# Patient Record
Sex: FEMALE | Race: OTHER | HISPANIC OR LATINO | Employment: FULL TIME | URBAN - METROPOLITAN AREA
[De-identification: names, ages, dates, MRNs, and addresses within clinical notes are randomized per-mention and may not be internally consistent; named-entity substitution may affect disease eponyms.]

---

## 2020-07-25 ENCOUNTER — OFFICE VISIT (OUTPATIENT)
Dept: URGENT CARE | Facility: PHYSICIAN GROUP | Age: 20
End: 2020-07-25
Payer: COMMERCIAL

## 2020-07-25 VITALS — RESPIRATION RATE: 14 BRPM | BODY MASS INDEX: 27.7 KG/M2 | WEIGHT: 187 LBS | HEIGHT: 69 IN

## 2020-07-25 DIAGNOSIS — L03.012 CELLULITIS OF FINGER OF LEFT HAND: ICD-10-CM

## 2020-07-25 PROCEDURE — 99204 OFFICE O/P NEW MOD 45 MIN: CPT | Performed by: NURSE PRACTITIONER

## 2020-07-25 RX ORDER — RIZATRIPTAN BENZOATE 10 MG/1
10 TABLET ORAL
COMMUNITY
Start: 2019-01-15 | End: 2021-11-16 | Stop reason: SDUPTHER

## 2020-07-25 RX ORDER — KETOCONAZOLE 20 MG/ML
SHAMPOO TOPICAL
COMMUNITY
Start: 2019-03-04 | End: 2021-03-03

## 2020-07-25 RX ORDER — NORTRIPTYLINE HYDROCHLORIDE 50 MG/1
50 CAPSULE ORAL
COMMUNITY
Start: 2019-01-15 | End: 2021-06-07

## 2020-07-25 RX ORDER — LEVONORGESTREL AND ETHINYL ESTRADIOL 0.1-0.02MG
1 KIT ORAL DAILY
COMMUNITY
Start: 2020-05-14 | End: 2021-06-07

## 2020-07-25 RX ORDER — IBUPROFEN 800 MG/1
800 TABLET ORAL
COMMUNITY
Start: 2018-06-12 | End: 2021-06-07 | Stop reason: SDUPTHER

## 2020-07-25 RX ORDER — ONDANSETRON HYDROCHLORIDE 8 MG/1
8 TABLET, FILM COATED ORAL
COMMUNITY
Start: 2020-03-27 | End: 2021-11-18

## 2020-07-25 RX ORDER — AMOXICILLIN 500 MG/1
500 CAPSULE ORAL 2 TIMES DAILY
Qty: 14 CAP | Refills: 0 | Status: SHIPPED | OUTPATIENT
Start: 2020-07-25 | End: 2020-08-01

## 2020-07-25 SDOH — HEALTH STABILITY: MENTAL HEALTH: HOW OFTEN DO YOU HAVE A DRINK CONTAINING ALCOHOL?: 2-4 TIMES A MONTH

## 2020-07-25 ASSESSMENT — ENCOUNTER SYMPTOMS
TINGLING: 0
BRUISES/BLEEDS EASILY: 0
SENSORY CHANGE: 0
WEAKNESS: 0
FEVER: 0
MYALGIAS: 0
CHILLS: 0
SHORTNESS OF BREATH: 0

## 2020-07-25 NOTE — PROGRESS NOTES
"Subjective:      Gretel Aguilar is a 20 y.o. female who presents with Nail Problem (L middle finger red and swollen x1wkj )            HPI  Admits to left middle finger redness, swelling x 1 week. Admits to bititng nails and picking at cuticles. Denies fever or malaise, no red streaking of hand. Warm water soaks and TAB ointment. Using hydrogen peroxide to clean.    PMH:  has no past medical history on file.  MEDS:   Current Outpatient Medications:   •  nortriptyline (PAMELOR) 50 MG capsule, Take 50 mg by mouth., Disp: , Rfl:   •  rizatriptan (MAXALT) 10 MG tablet, Take 10 mg by mouth., Disp: , Rfl:   •  ondansetron (ZOFRAN) 8 MG Tab, Take 8 mg by mouth., Disp: , Rfl:   •  levonorgestrel-ethinyl estradiol (AVIANE) 0.1-20 MG-MCG per tablet, Take 1 Tab by mouth every day., Disp: , Rfl:   •  ibuprofen (MOTRIN) 800 MG Tab, Take 800 mg by mouth., Disp: , Rfl:   •  ketoconazole (NIZORAL) 2 % shampoo, Apply  to affected area(s)., Disp: , Rfl:   •  amoxicillin (AMOXIL) 500 MG Cap, Take 1 Cap by mouth 2 times a day for 7 days., Disp: 14 Cap, Rfl: 0  ALLERGIES: Not on File  SURGHX: No past surgical history on file.  SOCHX:  reports that she has never smoked. She has never used smokeless tobacco. She reports current alcohol use. She reports that she does not use drugs.  FH: Family history was reviewed, no pertinent findings to report    Review of Systems   Constitutional: Negative for chills, fever and malaise/fatigue.   Respiratory: Negative for shortness of breath.    Musculoskeletal: Negative for joint pain and myalgias.   Skin: Negative for itching and rash.   Neurological: Negative for tingling, sensory change and weakness.   Endo/Heme/Allergies: Does not bruise/bleed easily.   All other systems reviewed and are negative.         Objective:     Resp 14   Ht 1.753 m (5' 9\")   Wt 84.8 kg (187 lb)   BMI 27.62 kg/m²      Physical Exam  Vitals signs reviewed.   Constitutional:       General: She is awake. She is not in acute " distress.     Appearance: Normal appearance. She is well-developed. She is not ill-appearing, toxic-appearing or diaphoretic.   HENT:      Head: Normocephalic.   Cardiovascular:      Rate and Rhythm: Normal rate.   Pulmonary:      Effort: Pulmonary effort is normal.   Musculoskeletal:         General: Swelling and tenderness present. No deformity or signs of injury.      Left hand: She exhibits tenderness and swelling. She exhibits normal range of motion, no bony tenderness, normal two-point discrimination, normal capillary refill, no deformity and no laceration. Normal sensation noted. Normal strength noted.   Skin:     General: Skin is warm and dry.      Coloration: Skin is not ashen, cyanotic, mottled, pale or sallow.      Findings: Erythema and wound present. No abrasion, bruising, ecchymosis or laceration.      Comments: Redness and swelling at base of cuticle bed. Open superficial wounds at lateral aspect of right middle finger cuticle edge, no active bleeding or d/c, mild TTP at site.    Neurological:      Mental Status: She is alert and oriented to person, place, and time.   Psychiatric:         Behavior: Behavior is cooperative.                 Assessment/Plan:       1. Cellulitis of finger of left hand    - amoxicillin (AMOXIL) 500 MG Cap; Take 1 Cap by mouth 2 times a day for 7 days.  Dispense: 14 Cap; Refill: 0    May use appropriate NSAID prn for discomfort  Wash in neutral pH, non-perfumed soap and lukewarm water, pat dry  Soak finger in Epsom salts prn  Cool compresses prn   Monitor for increase in redness of skin or spreading up hand/arm, blistering, weeping of sites, heat to skin, fever, malaise- need re-evaluation

## 2020-12-17 ENCOUNTER — OFFICE VISIT (OUTPATIENT)
Dept: URGENT CARE | Facility: CLINIC | Age: 20
End: 2020-12-17
Payer: COMMERCIAL

## 2020-12-17 VITALS
HEIGHT: 69 IN | HEART RATE: 95 BPM | OXYGEN SATURATION: 97 % | DIASTOLIC BLOOD PRESSURE: 88 MMHG | BODY MASS INDEX: 28.58 KG/M2 | SYSTOLIC BLOOD PRESSURE: 122 MMHG | WEIGHT: 193 LBS | RESPIRATION RATE: 16 BRPM | TEMPERATURE: 97.8 F

## 2020-12-17 DIAGNOSIS — R19.7 VOMITING AND DIARRHEA: ICD-10-CM

## 2020-12-17 DIAGNOSIS — R11.10 VOMITING AND DIARRHEA: ICD-10-CM

## 2020-12-17 PROCEDURE — 99214 OFFICE O/P EST MOD 30 MIN: CPT | Performed by: PHYSICIAN ASSISTANT

## 2020-12-17 RX ORDER — INFLUENZA A VIRUS A/IDAHO/07/2018 (H1N1) ANTIGEN (MDCK CELL DERIVED, PROPIOLACTONE INACTIVATED, INFLUENZA A VIRUS A/INDIANA/08/2018 (H3N2) ANTIGEN (MDCK CELL DERIVED, PROPIOLACTONE INACTIVATED), INFLUENZA B VIRUS B/SINGAPORE/INFTT-16-0610/2016 ANTIGEN (MDCK CELL DERIVED, PROPIOLACTONE INACTIVATED), INFLUENZA B VIRUS B/IOWA/06/2017 ANTIGEN (MDCK CELL DERIVED, PROPIOLACTONE INACTIVATED) 15; 15; 15; 15 UG/.5ML; UG/.5ML; UG/.5ML; UG/.5ML
INJECTION, SUSPENSION INTRAMUSCULAR
COMMUNITY
Start: 2020-10-15 | End: 2021-06-07

## 2020-12-17 RX ORDER — MEDROXYPROGESTERONE ACETATE 150 MG/ML
150 INJECTION, SUSPENSION INTRAMUSCULAR
COMMUNITY
Start: 2019-02-26 | End: 2021-06-07

## 2020-12-17 RX ORDER — ONDANSETRON 4 MG/1
4 TABLET, ORALLY DISINTEGRATING ORAL ONCE
Status: COMPLETED | OUTPATIENT
Start: 2020-12-17 | End: 2020-12-17

## 2020-12-17 RX ORDER — RIZATRIPTAN BENZOATE 10 MG/1
10 TABLET ORAL
COMMUNITY
Start: 2020-09-09 | End: 2021-06-07

## 2020-12-17 RX ORDER — CLOBETASOL PROPIONATE 0.46 MG/ML
SOLUTION TOPICAL
COMMUNITY
Start: 2019-03-04 | End: 2021-03-03

## 2020-12-17 RX ORDER — ONDANSETRON 4 MG/1
4 TABLET, ORALLY DISINTEGRATING ORAL EVERY 6 HOURS PRN
Qty: 10 TAB | Refills: 0 | Status: SHIPPED | OUTPATIENT
Start: 2020-12-17 | End: 2021-06-07

## 2020-12-17 RX ORDER — NORTRIPTYLINE HYDROCHLORIDE 50 MG/1
50 CAPSULE ORAL
COMMUNITY
Start: 2020-10-09 | End: 2021-06-07

## 2020-12-17 RX ADMIN — ONDANSETRON 4 MG: 4 TABLET, ORALLY DISINTEGRATING ORAL at 14:10

## 2020-12-17 ASSESSMENT — ENCOUNTER SYMPTOMS
CHANGE IN BOWEL HABIT: 1
ABDOMINAL PAIN: 1
PALPITATIONS: 0
COUGH: 0
DIAPHORESIS: 0
FEVER: 0
CHILLS: 0
HEADACHES: 0
WEIGHT LOSS: 0
WEAKNESS: 0
VOMITING: 1
HEARTBURN: 0
BLOOD IN STOOL: 0
SHORTNESS OF BREATH: 0
DIZZINESS: 0
NUMBER OF EPISODES OF EMESIS TODAY: 1
CONSTIPATION: 0
DIARRHEA: 1
NAUSEA: 1

## 2020-12-17 NOTE — PATIENT INSTRUCTIONS
Food Choices to Help Relieve Diarrhea, Adult  When you have diarrhea, the foods you eat and your eating habits are very important. Choosing the right foods and drinks can help:  · Relieve diarrhea.  · Replace lost fluids and nutrients.  · Prevent dehydration.  What general guidelines should I follow?    Relieving diarrhea  · Choose foods with less than 2 g or .07 oz. of fiber per serving.  · Limit fats to less than 8 tsp (38 g or 1.34 oz.) a day.  · Avoid the following:  ? Foods and beverages sweetened with high-fructose corn syrup, honey, or sugar alcohols such as xylitol, sorbitol, and mannitol.  ? Foods that contain a lot of fat or sugar.  ? Fried, greasy, or spicy foods.  ? High-fiber grains, breads, and cereals.  ? Raw fruits and vegetables.  · Eat foods that are rich in probiotics. These foods include dairy products such as yogurt and fermented milk products. They help increase healthy bacteria in the stomach and intestines (gastrointestinal tract, or GI tract).  · If you have lactose intolerance, avoid dairy products. These may make your diarrhea worse.  · Take medicine to help stop diarrhea (antidiarrheal medicine) only as told by your health care provider.  Replacing nutrients  · Eat small meals or snacks every 3-4 hours.  · Eat bland foods, such as white rice, toast, or baked potato, until your diarrhea starts to get better. Gradually reintroduce nutrient-rich foods as tolerated or as told by your health care provider. This includes:  ? Well-cooked protein foods.  ? Peeled, seeded, and soft-cooked fruits and vegetables.  ? Low-fat dairy products.  · Take vitamin and mineral supplements as told by your health care provider.  Preventing dehydration  · Start by sipping water or a special solution to prevent dehydration (oral rehydration solution, ORS). Urine that is clear or pale yellow means that you are getting enough fluid.  · Try to drink at least 8-10 cups of fluid each day to help replace lost  fluids.  · You may add other liquids in addition to water, such as clear juice or decaffeinated sports drinks, as tolerated or as told by your health care provider.  · Avoid drinks with caffeine, such as coffee, tea, or soft drinks.  · Avoid alcohol.  What foods are recommended?         The items listed may not be a complete list. Talk with your health care provider about what dietary choices are best for you.  Grains  White rice. White, German, or pam breads (fresh or toasted), including plain rolls, buns, or bagels. White pasta. Saltine, soda, or nafisa crackers. Pretzels. Low-fiber cereal. Cooked cereals made with water (such as cornmeal, farina, or cream cereals). Plain muffins. Matzo. [place] toast. [place].  Vegetables  Potatoes (without the skin). Most well-cooked and canned vegetables without skins or seeds. Tender lettuce.  Fruits  Apple sauce. Fruits canned in juice. Cooked apricots, cherries, grapefruit, peaches, pears, or plums. Fresh bananas and cantaloupe.  Meats and other protein foods  Baked or boiled chicken. Eggs. Tofu. Fish. Seafood. Smooth nut butters. Ground or well-cooked tender beef, ham, veal, lamb, pork, or poultry.  Dairy  Plain yogurt, kefir, and unsweetened liquid yogurt. Lactose-free milk, buttermilk, skim milk, or soy milk. Low-fat or nonfat hard cheese.  Beverages  Water. Low-calorie sports drinks. Fruit juices without pulp. Strained tomato and vegetable juices. Decaffeinated teas. Sugar-free beverages not sweetened with sugar alcohols. Oral rehydration solutions, if approved by your health care provider.  Seasoning and other foods  Bouillon, broth, or soups made from recommended foods.  What foods are not recommended?  The items listed may not be a complete list. Talk with your health care provider about what dietary choices are best for you.  Grains  Whole grain, whole wheat, bran, or rye breads, rolls, pastas, and crackers. Wild or brown rice. Whole grain or bran cereals. Barley.  Oats and oatmeal. Corn tortillas or taco shells. Granola. Popcorn.  Vegetables  Raw vegetables. Fried vegetables. Cabbage, broccoli, [place] sprouts, artichokes, baked beans, beet greens, corn, kale, legumes, peas, sweet potatoes, and yams. Potato skins. Cooked spinach and cabbage.  Fruits  Dried fruit, including raisins and dates. Raw fruits. Stewed or dried prunes. Canned fruits with syrup.  Meat and other protein foods  Fried or fatty meats. Deli meats. [place] nut butters. Nuts and seeds. Beans and lentils. [place]. Hot dogs. Sausage.  Dairy  High-fat cheeses. Whole milk, chocolate milk, and beverages made with milk, such as milk shakes. Half-and-half. Cream. sour cream. Ice cream.  Beverages  Caffeinated beverages (such as coffee, tea, soda, or energy drinks). Alcoholic beverages. Fruit juices with pulp. Prune juice. Soft drinks sweetened with high-fructose corn syrup or sugar alcohols. High-calorie sports drinks.  Fats and oils  Butter. Cream sauces. Margarine. Salad oils. Plain salad dressings. Olives. Avocados. Mayonnaise.  Sweets and desserts  Sweet rolls, doughnuts, and sweet breads. Sugar-free desserts sweetened with sugar alcohols such as xylitol and sorbitol.  Seasoning and other foods  Honey. Hot sauce. Chili powder. Gravy. Cream-based or milk-based soups. Pancakes and waffles.  Summary  · When you have diarrhea, the foods you eat and your eating habits are very important.  · Make sure you get at least 8-10 cups of fluid each day, or enough to keep your urine clear or pale yellow.  · Eat bland foods and gradually reintroduce healthy, nutrient-rich foods as tolerated, or as told by your health care provider.  · Avoid high-fiber, fried, greasy, or spicy foods.  This information is not intended to replace advice given to you by your health care provider. Make sure you discuss any questions you have with your health care provider.  Document Released: 03/09/2005 Document Revised: 04/09/2020 Document Reviewed:  12/15/2017  Elsevier Patient Education © 2020 [employer] Inc[employer]

## 2020-12-17 NOTE — LETTER
December 17, 2020         Patient: Gretel Aguilar   YOB: 2000   Date of Visit: 12/17/2020           To Whom it May Concern:    Gretel Aguilar was seen in my clinic on 12/17/2020. Please excuse her from work 12/18/2020.    If you have any questions or concerns, please don't hesitate to call.        Sincerely,           Glenn Mahajan P.A.-C.  Electronically Signed

## 2020-12-17 NOTE — PROGRESS NOTES
"Subjective:   Gretel Aguilar is a 20 y.o. female who presents for Emesis (x 16 hours, continuous)      Emesis  This is a new problem. The current episode started yesterday. The problem occurs constantly. The problem has been unchanged. Associated symptoms include abdominal pain, a change in bowel habit, nausea and vomiting. Pertinent negatives include no chest pain, chills, coughing, diaphoresis, fever, headaches, rash or weakness. Nothing aggravates the symptoms. She has tried nothing for the symptoms.       Review of Systems   Constitutional: Positive for malaise/fatigue. Negative for chills, diaphoresis, fever and weight loss.   Respiratory: Negative for cough and shortness of breath.    Cardiovascular: Negative for chest pain and palpitations.   Gastrointestinal: Positive for abdominal pain, change in bowel habit, diarrhea, nausea and vomiting. Negative for blood in stool, constipation, heartburn and melena.   Skin: Negative for itching and rash.   Neurological: Negative for dizziness, weakness and headaches.   All other systems reviewed and are negative.      Medications:    • clobetasol  • Flucelvax Quadrivalent Susp  • ibuprofen Tabs  • ketoconazole  • levonorgestrel-ethinyl estradiol  • medroxyPROGESTERone Jerrica  • nortriptyline  • ondansetron Tabs  • ondansetron Tbdp  • rizatriptan    Allergies: Patient has no known allergies.    Problem List: Gretel Aguilar does not have a problem list on file.    Surgical History:  No past surgical history on file.    Past Social Hx: Gretel Aguilar  reports that she has never smoked. She has never used smokeless tobacco. She reports current alcohol use. She reports that she does not use drugs.     Past Family Hx:  Gretel Aguilar family history is not on file.     Problem list, medications, and allergies reviewed by myself today in Epic.     Objective:     Blood Pressure 122/88   Pulse 95   Temperature 36.6 °C (97.8 °F) (Temporal)   Respiration 16   Height 1.753 m (5' 9\")  "  Weight 87.5 kg (193 lb)   Oxygen Saturation 97%   Body Mass Index 28.50 kg/m²     Physical Exam  Vitals signs reviewed.   Constitutional:       General: She is not in acute distress.     Appearance: Normal appearance. She is well-developed. She is not ill-appearing, toxic-appearing or diaphoretic.   HENT:      Head: Normocephalic and atraumatic.      Right Ear: External ear normal.      Left Ear: External ear normal.      Nose: Nose normal.   Eyes:      General: Lids are normal.      Conjunctiva/sclera: Conjunctivae normal.   Neck:      Musculoskeletal: Full passive range of motion without pain, normal range of motion and neck supple.   Cardiovascular:      Rate and Rhythm: Normal rate and regular rhythm.      Heart sounds: Normal heart sounds, S1 normal and S2 normal. No murmur. No friction rub. No gallop.    Pulmonary:      Effort: Pulmonary effort is normal. No respiratory distress.      Breath sounds: Normal breath sounds. No decreased breath sounds, wheezing or rales.   Chest:      Chest wall: No tenderness.   Abdominal:      General: Bowel sounds are normal. There is no distension or abdominal bruit.      Palpations: Abdomen is soft. Abdomen is not rigid. There is no shifting dullness, fluid wave, hepatomegaly, splenomegaly, mass or pulsatile mass.      Tenderness: There is abdominal tenderness. There is no right CVA tenderness, left CVA tenderness, guarding or rebound. Negative signs include Streeter's sign, Rovsing's sign, McBurney's sign, psoas sign and obturator sign.      Hernia: No hernia is present.      Comments: Abdomen: PTP in the generalized abdomen not out of proportion.  Soft and nondistended. Normal bowel sounds. No hepatosplenomegaly or masses, or hernias. No rebound or guarding.     Musculoskeletal: Normal range of motion.         General: No tenderness or deformity.   Skin:     General: Skin is warm and dry.      Findings: No bruising, ecchymosis or erythema.   Neurological:      Mental  Status: She is alert and oriented to person, place, and time.   Psychiatric:         Speech: Speech normal.         Behavior: Behavior normal.         Thought Content: Thought content normal.         Judgment: Judgment normal.         Assessment/Plan:     Medical Decision Making/Comments   Pt is a 20 yr old female who presents for evaluation of vomiting and diarrhea.  Pt states symptoms ongoing for 2 days.  Pt denies red flag symptoms of blood/mucus in BM, recent travel, camping, antibiotics/hospitalization.  Pt is not tolerated PO liquids.  Vital signs normal.  Abdomen: Soft, nontender, nondistended. Normal bowel sounds. No hepatosplenomegaly or masses, or hernias. No rebound or guarding.  Skin turgor is normal with no delayed tenting.  Probable AGE but if symptoms continue or worsen she is directed to ED for further evaluation.    Diagnosis differential includes but not limited to:    Common:  Gastroenteritis viral vs bacterial, cholecystitis, appendicitis.  Uncommon: IBD, obstructive/intussusception, ischemic colitis, c-diff, HUS.           Diagnosis and associated orders     1. Vomiting and diarrhea  ondansetron (ZOFRAN ODT) dispertab 4 mg    ondansetron (ZOFRAN ODT) 4 MG TABLET DISPERSIBLE   -oral hydration includin/2 tsp salt + 6 tsp sugar per 1 L  -BRAT/bland diet as tolerated  -Imodium OTC as needed             Differential diagnosis, natural history, supportive care, and indications for immediate follow-up discussed.    Advised the patient to follow-up with the primary care physician for recheck, reevaluation, and consideration of further management.    Please note that this dictation was created using voice recognition software. I have made a reasonable attempt to correct obvious errors, but I expect that there are errors of grammar and possibly content that I did not discover before finalizing the note.

## 2021-05-19 ENCOUNTER — TELEPHONE (OUTPATIENT)
Dept: SCHEDULING | Facility: IMAGING CENTER | Age: 21
End: 2021-05-19

## 2021-06-07 ENCOUNTER — HOSPITAL ENCOUNTER (OUTPATIENT)
Dept: LAB | Facility: MEDICAL CENTER | Age: 21
End: 2021-06-07
Attending: NURSE PRACTITIONER
Payer: COMMERCIAL

## 2021-06-07 ENCOUNTER — OFFICE VISIT (OUTPATIENT)
Dept: MEDICAL GROUP | Facility: LAB | Age: 21
End: 2021-06-07
Payer: COMMERCIAL

## 2021-06-07 VITALS
BODY MASS INDEX: 30.51 KG/M2 | WEIGHT: 206 LBS | RESPIRATION RATE: 12 BRPM | DIASTOLIC BLOOD PRESSURE: 72 MMHG | SYSTOLIC BLOOD PRESSURE: 120 MMHG | HEIGHT: 69 IN | HEART RATE: 95 BPM | TEMPERATURE: 97.4 F | OXYGEN SATURATION: 96 %

## 2021-06-07 DIAGNOSIS — D22.9 BENIGN SKIN MOLE: ICD-10-CM

## 2021-06-07 DIAGNOSIS — J45.990 EXERCISE-INDUCED ASTHMA: ICD-10-CM

## 2021-06-07 DIAGNOSIS — N80.9 ENDOMETRIOSIS DETERMINED BY LAPAROSCOPY: ICD-10-CM

## 2021-06-07 DIAGNOSIS — R19.7 DIARRHEA, UNSPECIFIED TYPE: ICD-10-CM

## 2021-06-07 PROBLEM — G43.909 MIGRAINE: Status: ACTIVE | Noted: 2018-10-03

## 2021-06-07 LAB
ANION GAP SERPL CALC-SCNC: 9 MMOL/L (ref 7–16)
BUN SERPL-MCNC: 10 MG/DL (ref 8–22)
CALCIUM SERPL-MCNC: 9.2 MG/DL (ref 8.5–10.5)
CHLORIDE SERPL-SCNC: 109 MMOL/L (ref 96–112)
CO2 SERPL-SCNC: 24 MMOL/L (ref 20–33)
CREAT SERPL-MCNC: 0.76 MG/DL (ref 0.5–1.4)
ERYTHROCYTE [DISTWIDTH] IN BLOOD BY AUTOMATED COUNT: 42.3 FL (ref 35.9–50)
GLUCOSE SERPL-MCNC: 87 MG/DL (ref 65–99)
HCT VFR BLD AUTO: 45 % (ref 37–47)
HGB BLD-MCNC: 14.4 G/DL (ref 12–16)
MCH RBC QN AUTO: 29.7 PG (ref 27–33)
MCHC RBC AUTO-ENTMCNC: 32 G/DL (ref 33.6–35)
MCV RBC AUTO: 92.8 FL (ref 81.4–97.8)
PLATELET # BLD AUTO: 338 K/UL (ref 164–446)
PMV BLD AUTO: 9.3 FL (ref 9–12.9)
POTASSIUM SERPL-SCNC: 4.3 MMOL/L (ref 3.6–5.5)
RBC # BLD AUTO: 4.85 M/UL (ref 4.2–5.4)
SODIUM SERPL-SCNC: 142 MMOL/L (ref 135–145)
WBC # BLD AUTO: 5 K/UL (ref 4.8–10.8)

## 2021-06-07 PROCEDURE — 85027 COMPLETE CBC AUTOMATED: CPT

## 2021-06-07 PROCEDURE — 99214 OFFICE O/P EST MOD 30 MIN: CPT | Performed by: NURSE PRACTITIONER

## 2021-06-07 PROCEDURE — 80048 BASIC METABOLIC PNL TOTAL CA: CPT

## 2021-06-07 PROCEDURE — 36415 COLL VENOUS BLD VENIPUNCTURE: CPT

## 2021-06-07 RX ORDER — IBUPROFEN 800 MG/1
800 TABLET ORAL EVERY 8 HOURS PRN
Qty: 30 TABLET | Refills: 3 | Status: SHIPPED | OUTPATIENT
Start: 2021-06-07 | End: 2022-03-16 | Stop reason: SDUPTHER

## 2021-06-07 RX ORDER — ALBUTEROL SULFATE 90 UG/1
2 AEROSOL, METERED RESPIRATORY (INHALATION) EVERY 6 HOURS PRN
Qty: 6.7 G | Refills: 2 | Status: SHIPPED | OUTPATIENT
Start: 2021-06-07 | End: 2022-08-23

## 2021-06-07 RX ORDER — ELAGOLIX 150 MG/1
150 TABLET, FILM COATED ORAL DAILY
COMMUNITY
Start: 2021-05-10 | End: 2022-06-16

## 2021-06-07 ASSESSMENT — PATIENT HEALTH QUESTIONNAIRE - PHQ9: CLINICAL INTERPRETATION OF PHQ2 SCORE: 0

## 2021-06-07 NOTE — PROGRESS NOTES
Subjective:     CC:  Diagnoses of Diarrhea, unspecified type, Endometriosis determined by laparoscopy, Benign skin mole, and Exercise-induced asthma were pertinent to this visit.    HISTORY OF THE PRESENT ILLNESS: Patient is a 21 y.o. female. This pleasant patient is here today to establish care and discuss the following:    Diarrhea  Patient reports that she had COVID in 1/2021 and since then she has noticed that she has had either diarrhea or very soft BMs. She reports that she will have a BM 3x per day. Before COVID she would only have 2 BMs per day. Denies melena, N/V, abdominal pain.     Endometriosis determined by laparoscopy  Patient has a history of endometriosis since adolescence. She is currently taking Orilissa 150 mg daily with relief of pain. She was started on the Orilissa in 3/2021. She also has an IUD that was placed in 3/2021. [facility]     Benign skin mole  Patient has had a mole on her back since childhood. Denies pain, itching. She reports that it has changed in size in the last 4 years so it is more noticeable now. Denies history of skin cancer, biopsies, blistering/severe sunburns, family history of skin cancer, family history of atypical moles.     Exercise-induced asthma  Patient had PFT testing done in March that was normal. Patient reports that during a heavy workout she will get short of breath and feels like the air feels very cold when she is breathing. Denies cough, wheezing, sleep disturbance.    Allergies: Patient has no known allergies.    Current Outpatient Medications Ordered in Epic   Medication Sig Dispense Refill   • ORILISSA 150 MG Tab Take 150 mg by mouth every day. TAKE 1 TABLET BY MOUTH DAILY     • albuterol 108 (90 Base) MCG/ACT Aero Soln inhalation aerosol Inhale 2 Puffs every 6 hours as needed for Shortness of Breath. 6.7 g 2   • ibuprofen (MOTRIN) 800 MG Tab Take 1 tablet by mouth every 8 hours as needed. 30 tablet 3   •  "rizatriptan (MAXALT) 10 MG tablet Take 10 mg by mouth.     • ondansetron (ZOFRAN) 8 MG Tab Take 8 mg by mouth.       No current Epic-ordered facility-administered medications on file.       Past Medical History:   Diagnosis Date   • Endometriosis    • Migraine        Past Surgical History:   Procedure Laterality Date   • ENDOMETRIAL ABLATION         Social History     Tobacco Use   • Smoking status: Never Smoker   • Smokeless tobacco: Never Used   Vaping Use   • Vaping Use: Never used   Substance Use Topics   • Alcohol use: Yes     Alcohol/week: 1.2 oz     Types: 2 Standard drinks or equivalent per week   • Drug use: Never       Family History   Problem Relation Age of Onset   • Heart Disease Maternal Grandmother    • Diabetes Paternal Grandmother    • Cancer Paternal Grandfather         liver, skin       ROS:   Gen: no fevers/chills, no changes in weight  Eyes: no changes in vision  ENT: no sore throat, no hearing loss, no bloody nose  Pulm: no sob, no cough  CV: no chest pain, no palpitations  GI: no nausea/vomiting, no diarrhea  : no dysuria  MSk: no myalgias  Skin: no rash  Neuro: no headaches, no numbness/tingling  Heme/Lymph: no easy bruising        - NOTE: All other systems reviewed and are negative, except as in HPI.      Objective:     Exam: /72 (BP Location: Right arm, Patient Position: Sitting, BP Cuff Size: Adult)   Pulse 95   Temp 36.3 °C (97.4 °F)   Resp 12   Ht 1.753 m (5' 9\")   Wt 93.4 kg (206 lb)   SpO2 96%  Body mass index is 30.42 kg/m².    General: Normal appearing. No distress.  HEENT: Normocephalic. Eyes conjunctiva clear lids without ptosis, pupils equal and reactive to light accommodation, ears normal shape and contour, canals are clear bilaterally, tympanic membranes are benign, nasal mucosa benign, oropharynx is without erythema, edema or exudates.   Neck: Supple without JVD or bruit. Thyroid is not enlarged.  Pulmonary: Clear to ausculation.  Normal effort. No rales, ronchi, " or wheezing.  Cardiovascular: Regular rate and rhythm without murmur. Carotid and radial pulses are intact and equal bilaterally.  Abdomen: Soft, nontender, nondistended. Normal bowel sounds. Liver and spleen are not palpable  Neurologic: Grossly nonfocal  Lymph: No cervical or supraclavicular lymph nodes are palpable  Skin: Warm and dry.  5mm papule on right scapula, appearing benign.  Musculoskeletal: Normal gait. No extremity cyanosis, clubbing, or edema.  Psych: Normal mood and affect. Alert and oriented x3. Judgment and insight is normal.    Labs: Ordered today in office.     Assessment & Plan:   21 y.o. female with the following -    1. Diarrhea, unspecified type  Discussed likely etiology of taking Orilissa. Discussed diet changes to include more fiber. Patient can take OTC anti-diarrheal as needed. Continue to monitor.    2. Endometriosis determined by laparoscopy  Continue to follow with gynecology. Take medication as prescribed. Continue to monitor.  - ibuprofen (MOTRIN) 800 MG Tab; Take 1 tablet by mouth every 8 hours as needed.  Dispense: 30 tablet; Refill: 3    3. Change in mole  Nevi is benign appearing. CMN on back. Reviewed ABCDEs and skin cancer detection/prevention. Patient to return to clinic for growth/changes/concerning features. Will consider referral to dermatology for removal options, if patient desires in the future.  Reports preference to defer today.     4. Exercise-induced asthma  Prescribed an albuterol inhaler for patient to use as needed before exercising. No symptoms or evidence of acute exacerbation.  Action plan discussed and understood by the patient.  - albuterol 108 (90 Base) MCG/ACT Aero Soln inhalation aerosol; Inhale 2 Puffs every 6 hours as needed for Shortness of Breath.  Dispense: 6.7 g; Refill: 2    Return in about 6 months (around 12/7/2021).    Please note that this dictation was created using voice recognition software. I have made every reasonable attempt to correct  obvious errors, but I expect that there are errors of grammar and possibly content that I did not discover before finalizing the note.

## 2021-06-07 NOTE — ASSESSMENT & PLAN NOTE
Patient had PFT testing done in March that was normal. Patient reports that during a heavy workout she will get short of breath and feels like the air feels very cold when she is breathing. Denies cough, wheezing, sleep disturbance.  
Patient has a history of endometriosis since adolescence. She is currently taking Orilissa 150 mg daily with relief of pain. She was started on the Orilissa in 3/2021. She also has an IUD that was placed in 3/2021. She is currently following with [facility].   
Patient has had a mole on her back since childhood. Denies pain, itching. She reports that it has changed in size in the last 4 years so it is more noticeable now. Denies history of skin cancer, biopsies, blistering/severe sunburns, family history of skin cancer, family history of atypical moles.   
Patient reports that she had COVID in 1/2021 and since then she has noticed that she has had either diarrhea or very soft BMs. She reports that she will have a BM 3x per day. Before COVID she would only have 2 BMs per day. Denies melena, N/V, abdominal pain.   
Statement Selected

## 2021-11-01 ENCOUNTER — OFFICE VISIT (OUTPATIENT)
Dept: MEDICAL GROUP | Facility: LAB | Age: 21
End: 2021-11-01
Payer: COMMERCIAL

## 2021-11-01 VITALS
DIASTOLIC BLOOD PRESSURE: 74 MMHG | BODY MASS INDEX: 30.66 KG/M2 | HEIGHT: 69 IN | WEIGHT: 207 LBS | HEART RATE: 78 BPM | TEMPERATURE: 97.9 F | RESPIRATION RATE: 14 BRPM | SYSTOLIC BLOOD PRESSURE: 106 MMHG | OXYGEN SATURATION: 94 %

## 2021-11-01 DIAGNOSIS — J02.9 PHARYNGITIS, UNSPECIFIED ETIOLOGY: ICD-10-CM

## 2021-11-01 LAB
INT CON NEG: NEGATIVE
INT CON POS: POSITIVE
S PYO AG THROAT QL: NEGATIVE

## 2021-11-01 PROCEDURE — 99213 OFFICE O/P EST LOW 20 MIN: CPT | Performed by: NURSE PRACTITIONER

## 2021-11-01 PROCEDURE — 87880 STREP A ASSAY W/OPTIC: CPT | Performed by: NURSE PRACTITIONER

## 2021-11-01 RX ORDER — METHYLPREDNISOLONE 4 MG/1
TABLET ORAL
Qty: 21 TABLET | Refills: 0 | Status: SHIPPED | OUTPATIENT
Start: 2021-11-01 | End: 2021-11-18

## 2021-11-01 NOTE — LETTER
November 1, 2021         Patient: Gretel Aguilar   YOB: 2000   Date of Visit: 11/1/2021           To Whom it May Concern:    Gretel Aguilar was seen in my clinic on 11/1/2021. She may return to work on 11/3/2021.    If you have any questions or concerns, please don't hesitate to call.        Sincerely,           ELBA Fraser.

## 2021-11-01 NOTE — PROGRESS NOTES
"Subjective:     CC: The encounter diagnosis was Pharyngitis, unspecified etiology.    HPI:   Gretel presents today with the following:    Upper respiratory infection  Onset 4 days ago. Current symptoms sore throat, hoarse voice, barking cough, lymphadenopathy.   Denies congestion, sinus pain/pressure, headache, fever, chills, myalgias, loss of taste/smell, shortness of breath.   Patient was vaccinated against covid 4/2021. She tested negative for covid today.     Current Outpatient Medications Ordered in Epic   Medication Sig Dispense Refill   • methylPREDNISolone (MEDROL DOSEPAK) 4 MG Tablet Therapy Pack As directed on the packaging label. 21 Tablet 0   • ORILISSA 150 MG Tab Take 150 mg by mouth every day. TAKE 1 TABLET BY MOUTH DAILY     • albuterol 108 (90 Base) MCG/ACT Aero Soln inhalation aerosol Inhale 2 Puffs every 6 hours as needed for Shortness of Breath. 6.7 g 2   • ibuprofen (MOTRIN) 800 MG Tab Take 1 tablet by mouth every 8 hours as needed. 30 tablet 3   • rizatriptan (MAXALT) 10 MG tablet Take 10 mg by mouth.     • ondansetron (ZOFRAN) 8 MG Tab Take 8 mg by mouth.       No current Epic-ordered facility-administered medications on file.     ROS:   As documented in history of present illness above    Objective:     Exam: /74 (BP Location: Right arm, Patient Position: Sitting, BP Cuff Size: Adult)   Pulse 78   Temp 36.6 °C (97.9 °F)   Resp 14   Ht 1.753 m (5' 9\")   Wt 93.9 kg (207 lb)   SpO2 94%  Body mass index is 30.57 kg/m².    General: Normal appearing. No distress.  HEENT: Normocephalic. Eyes conjunctiva clear lids without ptosis, pupils equal and reactive to light accommodation, ears normal shape and contour, canals are clear bilaterally, tympanic membranes are benign, nasal mucosa benign, oropharynx is without erythema, edema or exudates.   Neck: Supple without JVD or bruit. Thyroid is not enlarged.  Pulmonary: Clear to ausculation.  Normal effort. No rales, ronchi, or " wheezing.  Cardiovascular: Regular rate and rhythm without murmur. Carotid and radial pulses are intact and equal bilaterally.  Abdomen: Soft, nontender, nondistended. Normal bowel sounds. Liver and spleen are not palpable  Neurologic: Grossly nonfocal  Lymph: No cervical or supraclavicular lymph nodes are palpable  Skin: Warm and dry.  No obvious lesions.  Musculoskeletal: Normal gait. No extremity cyanosis, clubbing, or edema.  Psych: Normal mood and affect. Alert and oriented x3. Judgment and insight is normal.    Assessment & Plan:     21 y.o. female with the following -     1. Pharyngitis, unspecified etiology  Patient to take medication as prescribed. Side effects of medication prescribed today were discussed with the patient including how to take the medication and proper dosage. Discussed repercussions of not taking the medication as prescribed. Instructed to call the office should she have any negative side effects or problems with the medication. Supportive care, differential diagnoses, and indications for immediate follow-up discussed with patient. Pathogenesis of diagnosis discussed including typical length and natural progression. Instructed to return to clinic or nearest emergency department for any change in condition, further concerns, or worsening of symptoms.  - POCT Rapid Strep A  - methylPREDNISolone (MEDROL DOSEPAK) 4 MG Tablet Therapy Pack; As directed on the packaging label.  Dispense: 21 Tablet; Refill: 0    Return if symptoms worsen or fail to improve.    Please note that this dictation was created using voice recognition software. I have made every reasonable attempt to correct obvious errors, but I expect that there are errors of grammar and possibly content that I did not discover before finalizing the note.

## 2021-11-16 RX ORDER — RIZATRIPTAN BENZOATE 10 MG/1
10 TABLET ORAL
Qty: 10 TABLET | Refills: 11 | Status: SHIPPED | OUTPATIENT
Start: 2021-11-16 | End: 2022-03-16 | Stop reason: SDUPTHER

## 2021-11-16 NOTE — TELEPHONE ENCOUNTER
Received request via: Pharmacy    Was the patient seen in the last year in this department? Yes  11/1/21  Does the patient have an active prescription (recently filled or refills available) for medication(s) requested? No

## 2021-11-16 NOTE — TELEPHONE ENCOUNTER
----- Message from Gretel Aguilar sent at 11/16/2021 10:48 AM PST -----  Regarding: Refill on Rizatriptan   Hi, I was wondering if I am able to get a refill of prescription of the rizatriptan, I have about one left from my previous prescription from my other provider before I switched over to [facility]. I use this prescription as PRN when having a migraine.     Thank you,   Gretel

## 2021-11-18 ENCOUNTER — OFFICE VISIT (OUTPATIENT)
Dept: URGENT CARE | Facility: CLINIC | Age: 21
End: 2021-11-18
Payer: COMMERCIAL

## 2021-11-18 ENCOUNTER — HOSPITAL ENCOUNTER (OUTPATIENT)
Facility: MEDICAL CENTER | Age: 21
End: 2021-11-18
Attending: PHYSICIAN ASSISTANT
Payer: COMMERCIAL

## 2021-11-18 VITALS
SYSTOLIC BLOOD PRESSURE: 100 MMHG | DIASTOLIC BLOOD PRESSURE: 72 MMHG | OXYGEN SATURATION: 99 % | BODY MASS INDEX: 29.18 KG/M2 | WEIGHT: 197 LBS | TEMPERATURE: 97.5 F | HEART RATE: 64 BPM | HEIGHT: 69 IN

## 2021-11-18 DIAGNOSIS — N80.9 ENDOMETRIOSIS: ICD-10-CM

## 2021-11-18 DIAGNOSIS — N94.9 PAIN OF FEMALE GENITALIA: ICD-10-CM

## 2021-11-18 LAB
APPEARANCE UR: CLEAR
BILIRUB UR STRIP-MCNC: NORMAL MG/DL
CANDIDA DNA VAG QL PROBE+SIG AMP: NEGATIVE
COLOR UR AUTO: YELLOW
G VAGINALIS DNA VAG QL PROBE+SIG AMP: POSITIVE
GLUCOSE UR STRIP.AUTO-MCNC: NORMAL MG/DL
INT CON NEG: NORMAL
INT CON POS: NORMAL
KETONES UR STRIP.AUTO-MCNC: NORMAL MG/DL
LEUKOCYTE ESTERASE UR QL STRIP.AUTO: NORMAL
NITRITE UR QL STRIP.AUTO: NORMAL
PH UR STRIP.AUTO: 6 [PH] (ref 5–8)
POC URINE PREGNANCY TEST: NEGATIVE
PROT UR QL STRIP: NORMAL MG/DL
RBC UR QL AUTO: NORMAL
SP GR UR STRIP.AUTO: 1.02
T VAGINALIS DNA VAG QL PROBE+SIG AMP: NEGATIVE
UROBILINOGEN UR STRIP-MCNC: 0.2 MG/DL

## 2021-11-18 PROCEDURE — 87480 CANDIDA DNA DIR PROBE: CPT

## 2021-11-18 PROCEDURE — 99213 OFFICE O/P EST LOW 20 MIN: CPT | Performed by: PHYSICIAN ASSISTANT

## 2021-11-18 PROCEDURE — 87510 GARDNER VAG DNA DIR PROBE: CPT

## 2021-11-18 PROCEDURE — 81025 URINE PREGNANCY TEST: CPT | Performed by: PHYSICIAN ASSISTANT

## 2021-11-18 PROCEDURE — 81002 URINALYSIS NONAUTO W/O SCOPE: CPT | Performed by: PHYSICIAN ASSISTANT

## 2021-11-18 PROCEDURE — 87660 TRICHOMONAS VAGIN DIR PROBE: CPT

## 2021-11-18 ASSESSMENT — ENCOUNTER SYMPTOMS
CHILLS: 0
DIARRHEA: 0
NAUSEA: 0
FLANK PAIN: 0
FEVER: 0
VOMITING: 0

## 2021-11-18 NOTE — PROGRESS NOTES
"Subjective:     Gretel Aguilar  is a 21 y.o. female who presents for Dysuria (x2 days ) and Vaginal Pain       Dysuria     Vaginal Pain          Review of Systems   Genitourinary: Positive for dysuria and vaginal pain.     No Known Allergies  Past medical history, family history, surgical history and social history are reviewed and updated in the record today.     Objective:   /72   Pulse 64   Temp 36.4 °C (97.5 °F) (Temporal)   Ht 1.753 m (5' 9\")   Wt 89.4 kg (197 lb)   SpO2 99%   Breastfeeding No   BMI 29.09 kg/m²   Physical Exam     Assessment/Plan:   There are no diagnoses linked to this encounter.  Differential diagnosis, natural history, supportive care, and indications for immediate follow-up discussed.  Red flag warning symptoms and strict ER/follow-up precautions given.  The patient demonstrated a good understanding and agreed with the treatment plan.  Please note that this note was created using voice recognition speech to text software. Every effort has been made to correct obvious errors.  However, I expect there are errors of grammar and possibly context that were not discovered prior to finalizing the note  COOKIE Covarrubias PA-C    "

## 2021-11-18 NOTE — PROGRESS NOTES
"Subjective:     Gretel Aguilar  is a 21 y.o. female who presents for Vaginal Pain (x2 days )       Vaginal Pain  Pertinent negatives include no chills, fever, nausea or vomiting.     Ms. Aguilar is a very pleasant 21-year-old female who presents to urgent care with intermittent vaginal pain/burning for the past 4 days.  Patient reports that 4 days ago she had brief stinging burning sensation in the vaginal area which resolved rather quickly.  This again occurred yesterday.  She was concerned about the possibility of a UTI and presents today for further evaluation.  Patient denies any true dysuria, urgency or frequency with urination.  Denies incomplete bladder emptying sensation.  Denies abdominal pain, nausea, vomiting or diarrhea.  Denies fever or chills.  Patient has had no new sexual partners and is not concerned about STIs.  She has recently been on a steroid Dosepak.  She denies any vaginal itching or discharge.    Review of Systems   Constitutional: Negative for chills and fever.   Gastrointestinal: Negative for diarrhea, nausea and vomiting.   Genitourinary: Positive for vaginal pain. Negative for dysuria, flank pain, frequency, hematuria and urgency.        Vaginal burning/stinging   All other systems reviewed and are negative.    No Known Allergies  Past medical history, family history, surgical history and social history are reviewed and updated in the record today.     Objective:   /72   Pulse 64   Temp 36.4 °C (97.5 °F) (Temporal)   Ht 1.753 m (5' 9\")   Wt 89.4 kg (197 lb)   SpO2 99%   Breastfeeding No   BMI 29.09 kg/m²   Physical Exam  Vitals and nursing note reviewed.   Constitutional:       Appearance: She is well-developed.   HENT:      Head: Normocephalic and atraumatic.      Right Ear: External ear normal.      Left Ear: External ear normal.      Nose: Nose normal.      Mouth/Throat:      Mouth: Mucous membranes are moist.   Eyes:      Extraocular Movements: Extraocular movements intact. "      Conjunctiva/sclera: Conjunctivae normal.      Pupils: Pupils are equal, round, and reactive to light.   Cardiovascular:      Rate and Rhythm: Normal rate and regular rhythm.      Heart sounds: Normal heart sounds.   Pulmonary:      Effort: Pulmonary effort is normal.      Breath sounds: Normal breath sounds.   Abdominal:      General: Abdomen is flat. Bowel sounds are normal.      Palpations: Abdomen is soft.      Tenderness: There is no abdominal tenderness. There is no right CVA tenderness or left CVA tenderness.   Musculoskeletal:         General: Normal range of motion.      Cervical back: Normal range of motion and neck supple.   Lymphadenopathy:      Cervical: No cervical adenopathy.   Skin:     General: Skin is warm and dry.      Findings: No rash.   Neurological:      Mental Status: She is alert and oriented to person, place, and time.      Cranial Nerves: Cranial nerves are intact.      Sensory: Sensation is intact.      Motor: Motor function is intact.      Coordination: Coordination is intact.   Psychiatric:         Attention and Perception: Attention normal.         Mood and Affect: Mood normal.         Speech: Speech normal.         Behavior: Behavior normal.         Thought Content: Thought content normal.         Judgment: Judgment normal.          Assessment/Plan:   1. Pain of female genitalia  - POCT Urinalysis  - POCT Pregnancy  - VAGINAL PATHOGENS DNA PANEL; Future    2. Endometriosis    Urinalysis: Negative leukocyte esterase, negative nitrites, negative blood   Pregnancy: Negative     Check vaginal pathogens.  Patient is sexually active in a monogamous relationship with her boyfriend and does not feel that she needs testing for chlamydia or gonorrhea.  Patient would contacted with test results and treated accordingly.  If vaginal path negative and continued symptoms recommend follow-up with GYN.  Patient does have history of endometriosis and does have a gynecologist with whom she will  follow up.    differential diagnosis, natural history, supportive care, and indications for immediate follow-up discussed.  Red flag warning symptoms and strict ER/follow-up precautions given.  The patient demonstrated a good understanding and agreed with the treatment plan.  Please note that this note was created using voice recognition speech to text software. Every effort has been made to correct obvious errors.  However, I expect there are errors of grammar and possibly context that were not discovered prior to finalizing the note  COOKIE Covarrubias PA-C

## 2021-11-19 ENCOUNTER — TELEPHONE (OUTPATIENT)
Dept: URGENT CARE | Facility: CLINIC | Age: 21
End: 2021-11-19

## 2021-11-19 DIAGNOSIS — B96.89 BACTERIAL VAGINOSIS: ICD-10-CM

## 2021-11-19 DIAGNOSIS — N76.0 BACTERIAL VAGINOSIS: ICD-10-CM

## 2021-11-19 RX ORDER — METRONIDAZOLE 7.5 MG/G
1 GEL VAGINAL
Qty: 5 EACH | Refills: 0 | Status: SHIPPED | OUTPATIENT
Start: 2021-11-19 | End: 2021-11-24

## 2021-11-19 NOTE — TELEPHONE ENCOUNTER
Attempted to contact patient with test results positive for bacterial vaginosis.  Voicemail left with contact phone number to return call to notify which medication she would prefer either vaginal preparation or oral medication and what pharmacy she would like this sent to.  Edna Goel PA-C

## 2021-11-19 NOTE — TELEPHONE ENCOUNTER
Patient returned call.  Test results reviewed.  Will treat with metronidazole vaginal preparation for bacterial vaginosis.  Patient requests pharmacy in [place] as she is out of town for the weekend.  Prescription sent electronically to [facility] at [address] [place] [address]  Edna Goel PA-C

## 2021-12-01 ENCOUNTER — HOSPITAL ENCOUNTER (OUTPATIENT)
Facility: MEDICAL CENTER | Age: 21
End: 2021-12-01
Attending: PHYSICIAN ASSISTANT
Payer: COMMERCIAL

## 2021-12-01 ENCOUNTER — OFFICE VISIT (OUTPATIENT)
Dept: URGENT CARE | Facility: PHYSICIAN GROUP | Age: 21
End: 2021-12-01
Payer: COMMERCIAL

## 2021-12-01 VITALS
HEIGHT: 69 IN | OXYGEN SATURATION: 95 % | HEART RATE: 130 BPM | DIASTOLIC BLOOD PRESSURE: 64 MMHG | BODY MASS INDEX: 29.18 KG/M2 | RESPIRATION RATE: 20 BRPM | SYSTOLIC BLOOD PRESSURE: 108 MMHG | TEMPERATURE: 97.9 F | WEIGHT: 197 LBS

## 2021-12-01 DIAGNOSIS — R52 GENERALIZED BODY ACHES: ICD-10-CM

## 2021-12-01 DIAGNOSIS — R11.0 NAUSEA: ICD-10-CM

## 2021-12-01 DIAGNOSIS — Z20.822 SUSPECTED COVID-19 VIRUS INFECTION: ICD-10-CM

## 2021-12-01 PROCEDURE — 99213 OFFICE O/P EST LOW 20 MIN: CPT | Mod: CS | Performed by: PHYSICIAN ASSISTANT

## 2021-12-01 PROCEDURE — 87420 RESP SYNCYTIAL VIRUS AG IA: CPT

## 2021-12-01 PROCEDURE — 0240U HCHG SARS-COV-2 COVID-19 NFCT DS RESP RNA 3 TRGT MIC: CPT

## 2021-12-01 RX ORDER — ONDANSETRON 4 MG/1
4 TABLET, FILM COATED ORAL EVERY 6 HOURS PRN
Qty: 20 EACH | Refills: 0 | Status: SHIPPED | OUTPATIENT
Start: 2021-12-01 | End: 2023-08-10

## 2021-12-01 ASSESSMENT — ENCOUNTER SYMPTOMS
COUGH: 0
HEADACHES: 1
VOMITING: 0
FATIGUE: 1
NAUSEA: 1
MYALGIAS: 1
BODY ACHES: 1
CHILLS: 1

## 2021-12-01 NOTE — PROGRESS NOTES
"Subjective     Gretel Aguilar is a 21 y.o. female who presents with Body Aches (chills; last night)            Patient is a 21-year-old female who presents to urgent care with body aches, and chills, headache since yesterday.  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  She does report being fully vaccinated to COVID-19.  She does live with her boyfriend who is ill with similar symptoms at this time.  She did also reports associated nausea which she has been taking Zofran with mild improvement.  Denies any shortness of breath, cough.  She does report slight sore throat in the middle of the night last night however denies at this time.    Generalized Body Aches  This is a new problem. The current episode started yesterday. The problem occurs constantly. Associated symptoms include chills, fatigue, headaches, myalgias and nausea. Pertinent negatives include no congestion, coughing, rash or vomiting. Nothing aggravates the symptoms. Treatments tried: As above, Tylenol.       Review of Systems   Constitutional: Positive for chills, fatigue and malaise/fatigue.   HENT: Negative for congestion.    Respiratory: Negative for cough.    Gastrointestinal: Positive for nausea. Negative for vomiting.   Musculoskeletal: Positive for myalgias.   Skin: Negative for rash.   Neurological: Positive for headaches.   All other systems reviewed and are negative.             Objective     /64 (BP Location: Right arm, Patient Position: Sitting, BP Cuff Size: Adult)   Pulse (!) 130   Temp 36.6 °C (97.9 °F) (Temporal)   Resp 20   Ht 1.753 m (5' 9\")   Wt 89.4 kg (197 lb)   SpO2 95%   BMI 29.09 kg/m²    PMH:  has a past medical history of Endometriosis and Migraine.  MEDS: Reviewed .   ALLERGIES: No Known Allergies  SURGHX:   Past Surgical History:   Procedure Laterality Date   • ENDOMETRIAL ABLATION       SOCHX:  reports that she has never smoked. She has never used smokeless tobacco. She reports current " alcohol use of about 1.2 oz of alcohol per week. She reports that she does not use drugs.  FH: Family history was reviewed, no pertinent findings to report    Physical Exam  Vitals reviewed.   Constitutional:       General: She is not in acute distress.     Appearance: She is well-developed.   HENT:      Head: Normocephalic and atraumatic.      Right Ear: External ear normal.      Left Ear: External ear normal.      Nose: No congestion.   Eyes:      Conjunctiva/sclera: Conjunctivae normal.      Pupils: Pupils are equal, round, and reactive to light.   Neck:      Trachea: No tracheal deviation.   Cardiovascular:      Rate and Rhythm: Tachycardia present.   Pulmonary:      Effort: Pulmonary effort is normal.   Musculoskeletal:         General: Normal range of motion.      Cervical back: Normal range of motion and neck supple.   Skin:     General: Skin is warm.      Findings: No rash.      Comments: No rash to area exposed during the visit today.    Neurological:      Mental Status: She is alert and oriented to person, place, and time.      Coordination: Coordination normal.   Psychiatric:         Behavior: Behavior normal.         Thought Content: Thought content normal.         Judgment: Judgment normal.                             Assessment & Plan        1. Generalized body aches  - CoV-2, Flu A/B, And RSV by PCR; Future    2. Suspected COVID-19 virus infection  - CoV-2, Flu A/B, And RSV by PCR; Future    3. Nausea  - CoV-2, Flu A/B, And RSV by PCR; Future  - ondansetron (ZOFRAN) 4 MG Tab tablet; Take 1 Tablet by mouth every 6 hours as needed for Nausea/Vomiting.  Dispense: 20 Each; Refill: 0              High suspicion for COVID-19 although she does report parainfluenza, RSV and adenovirus in local ER.  We will send him for the above.Appropriate PPE worn at all times by provider.   Pt. Had face mask on throughout entirety of the visit other than oropharyngeal examination today.     Testing performed for COVID-19.     Patient currently without indication of need for higher level of care.    Reviewed with patient/guardian that if they do test positive they will be contacted by their local health department regarding return to work/school protocols.  Results will also be released to patient/guardian in MyChart or called to the patient/guardian directly. Discussed isolation/quarantine recommendations.  Encouraged mask use, frequent handwashing, wiping down hard surfaces, etc.    Patient and/or guardian given precautionary s/sx that mandate immediate follow up and evaluation in the ED. Advised of risks of not doing so.  Side effects of OTC or prescribed medications discussed.   DDX, Supportive care, and indications for immediate follow-up discussed.  Instructed to return to clinic or nearest emergency department if we are not available for any change in condition, further concerns, or worsening of symptoms.    The patient and/or guardian demonstrated a good understanding and agreed with the treatment plan.    Please note that this dictation was created using voice recognition software. I have made every reasonable attempt to correct obvious errors, but I expect that there are errors of grammar and possibly content that I did not discover before finalizing the note.      \

## 2021-12-02 LAB
FLUAV RNA SPEC QL NAA+PROBE: NEGATIVE
FLUBV RNA SPEC QL NAA+PROBE: NEGATIVE
RSV AG SPEC QL IA: NORMAL
SARS-COV-2 RNA RESP QL NAA+PROBE: NOTDETECTED
SIGNIFICANT IND 70042: NORMAL
SITE SITE: NORMAL
SOURCE SOURCE: NORMAL
SPECIMEN SOURCE: NORMAL

## 2022-02-14 ENCOUNTER — OFFICE VISIT (OUTPATIENT)
Dept: URGENT CARE | Facility: CLINIC | Age: 22
End: 2022-02-14
Payer: COMMERCIAL

## 2022-02-14 ENCOUNTER — HOSPITAL ENCOUNTER (OUTPATIENT)
Facility: MEDICAL CENTER | Age: 22
End: 2022-02-14
Attending: FAMILY MEDICINE
Payer: COMMERCIAL

## 2022-02-14 VITALS
RESPIRATION RATE: 16 BRPM | HEIGHT: 69 IN | SYSTOLIC BLOOD PRESSURE: 116 MMHG | HEART RATE: 79 BPM | BODY MASS INDEX: 28.44 KG/M2 | WEIGHT: 192 LBS | DIASTOLIC BLOOD PRESSURE: 82 MMHG | TEMPERATURE: 98.5 F | OXYGEN SATURATION: 98 %

## 2022-02-14 DIAGNOSIS — N76.0 ACUTE VAGINITIS: ICD-10-CM

## 2022-02-14 DIAGNOSIS — R30.0 DYSURIA: ICD-10-CM

## 2022-02-14 LAB

## 2022-02-14 PROCEDURE — 99213 OFFICE O/P EST LOW 20 MIN: CPT | Performed by: FAMILY MEDICINE

## 2022-02-14 PROCEDURE — 81025 URINE PREGNANCY TEST: CPT | Performed by: FAMILY MEDICINE

## 2022-02-14 PROCEDURE — 87480 CANDIDA DNA DIR PROBE: CPT

## 2022-02-14 PROCEDURE — 87510 GARDNER VAG DNA DIR PROBE: CPT

## 2022-02-14 PROCEDURE — 87491 CHLMYD TRACH DNA AMP PROBE: CPT

## 2022-02-14 PROCEDURE — 87086 URINE CULTURE/COLONY COUNT: CPT

## 2022-02-14 PROCEDURE — 87660 TRICHOMONAS VAGIN DIR PROBE: CPT

## 2022-02-14 PROCEDURE — 87591 N.GONORRHOEAE DNA AMP PROB: CPT

## 2022-02-14 PROCEDURE — 81002 URINALYSIS NONAUTO W/O SCOPE: CPT | Performed by: FAMILY MEDICINE

## 2022-02-14 RX ORDER — METRONIDAZOLE 500 MG/1
500 TABLET ORAL EVERY 12 HOURS
Qty: 14 TABLET | Refills: 0 | Status: SHIPPED | OUTPATIENT
Start: 2022-02-14 | End: 2022-02-21

## 2022-02-15 DIAGNOSIS — N76.0 ACUTE VAGINITIS: ICD-10-CM

## 2022-02-15 DIAGNOSIS — R30.0 DYSURIA: ICD-10-CM

## 2022-02-15 LAB
C TRACH DNA SPEC QL NAA+PROBE: NEGATIVE
CANDIDA DNA VAG QL PROBE+SIG AMP: NEGATIVE
G VAGINALIS DNA VAG QL PROBE+SIG AMP: POSITIVE
N GONORRHOEA DNA SPEC QL NAA+PROBE: NEGATIVE
SPECIMEN SOURCE: NORMAL
T VAGINALIS DNA VAG QL PROBE+SIG AMP: NEGATIVE

## 2022-02-15 NOTE — PATIENT INSTRUCTIONS
Discharge Instructions:  Certainly if not improving in the next 48-72 hours or developing new/concerning symptoms or getting/feeling worse (such as nausea, vomiting, fever/chills, chest pain, shortness of breath) return to walk-in clinic or ER immediately for in-person evaluation.    Please see your primary care doctor regularly for routine check-ups and call/visit with your primary care doctor to follow up on this visit to make sure you are improving and no additional treatment or on-going work up is needed. If you do not have a primary care doctor and need to schedule one you may call [facility] at [phone] to do this.

## 2022-02-15 NOTE — PROGRESS NOTES
"Subjective     Gretel Aguilar is a 21 y.o. female who presents with Painful Urination (x1 day, burning with urination)      - This is a pleasant and nontoxic appearing 21 y.o. female who has come to the walk-in clinic today for:    #1) vaginal dc w/ odor started yesterday, feels like when had BV before, a little bit of irritation. No burning on urination, no urinary freq      ALLERGIES:  Patient has no known allergies.     PMH:  Past Medical History:   Diagnosis Date   • Endometriosis    • Migraine         PSH:  Past Surgical History:   Procedure Laterality Date   • ENDOMETRIAL ABLATION         MEDS:    Current Outpatient Medications:   •  metroNIDAZOLE (FLAGYL) 500 MG Tab, Take 1 Tablet by mouth every 12 hours for 7 days., Disp: 14 Tablet, Rfl: 0  •  ondansetron (ZOFRAN) 4 MG Tab tablet, Take 1 Tablet by mouth every 6 hours as needed for Nausea/Vomiting., Disp: 20 Each, Rfl: 0  •  Levonorgestrel (LILETTA, 52 MG, IU), by Intrauterine route., Disp: , Rfl:   •  rizatriptan (MAXALT) 10 MG tablet, Take 1 Tablet by mouth one time as needed for Migraine for up to 1 dose., Disp: 10 Tablet, Rfl: 11  •  ORILISSA 150 MG Tab, Take 150 mg by mouth every day. TAKE 1 TABLET BY MOUTH DAILY, Disp: , Rfl:   •  albuterol 108 (90 Base) MCG/ACT Aero Soln inhalation aerosol, Inhale 2 Puffs every 6 hours as needed for Shortness of Breath., Disp: 6.7 g, Rfl: 2  •  ibuprofen (MOTRIN) 800 MG Tab, Take 1 tablet by mouth every 8 hours as needed., Disp: 30 tablet, Rfl: 3    ** I have documented what I find to be significant in regards to past medical, social, family and surgical history  in my HPI or under PMH/PSH/FH review section, otherwise it is noncontributory **           HPI    Review of Systems   Genitourinary: Positive for dysuria and frequency.   All other systems reviewed and are negative.             Objective     /82   Pulse 79   Temp 36.9 °C (98.5 °F) (Temporal)   Resp 16   Ht 1.753 m (5' 9\")   Wt 87.1 kg (192 lb)   " SpO2 98%   Breastfeeding No   BMI 28.35 kg/m²      Physical Exam  Vitals and nursing note reviewed.   Constitutional:       General: She is not in acute distress.     Appearance: Normal appearance. She is well-developed.   HENT:      Head: Normocephalic and atraumatic.   Eyes:      General: No scleral icterus.  Cardiovascular:      Heart sounds: Normal heart sounds. No murmur heard.      Pulmonary:      Effort: Pulmonary effort is normal. No respiratory distress.   Abdominal:      Palpations: Abdomen is soft.      Tenderness: There is no abdominal tenderness.   Skin:     Coloration: Skin is not jaundiced or pale.   Neurological:      Mental Status: She is alert.      Motor: No abnormal muscle tone.   Psychiatric:         Mood and Affect: Mood normal.         Behavior: Behavior normal.           Assessment & Plan       1. Dysuria  POCT Urinalysis    URINE CULTURE(NEW)    POCT Pregnancy   2. Acute vaginitis  metroNIDAZOLE (FLAGYL) 500 MG Tab    VAGINAL PATHOGENS DNA PANEL    CHLAMYDIA/GC PCR URINE OR SWAB       - Dx, plan & d/c instructions discussed   - Rest, stay hydrated, OTC Motrin and/or Tylenol as needed  - E.R. precautions discussed     Asked to kindly follow up with their PCP's office in 2-3 days for a recheck, ER if not improving or feeling/getting worse. If you do not have a primary care doctor and need to schedule one you may call [facility] at [phone] to do this.    Any realistic side effects of medications that may have been given today reviewed.     Patient left in stable condition     POCT results reviewed/discussed

## 2022-02-17 LAB
BACTERIA UR CULT: NORMAL
SIGNIFICANT IND 70042: NORMAL
SITE SITE: NORMAL
SOURCE SOURCE: NORMAL

## 2022-02-22 ENCOUNTER — OFFICE VISIT (OUTPATIENT)
Dept: MEDICAL GROUP | Facility: LAB | Age: 22
End: 2022-02-22
Payer: COMMERCIAL

## 2022-02-22 VITALS
RESPIRATION RATE: 14 BRPM | WEIGHT: 199.2 LBS | TEMPERATURE: 97.7 F | SYSTOLIC BLOOD PRESSURE: 104 MMHG | BODY MASS INDEX: 29.51 KG/M2 | HEART RATE: 74 BPM | OXYGEN SATURATION: 95 % | DIASTOLIC BLOOD PRESSURE: 70 MMHG | HEIGHT: 69 IN

## 2022-02-22 DIAGNOSIS — R63.5 WEIGHT GAIN: ICD-10-CM

## 2022-02-22 PROCEDURE — 99212 OFFICE O/P EST SF 10 MIN: CPT | Performed by: NURSE PRACTITIONER

## 2022-02-23 NOTE — ASSESSMENT & PLAN NOTE
Patient reports that she always been overweight, but she has been trying recently to lose weight (about 1.5 years) and has not seen a significant improvement.   She reports that her weight increased after she was diagnosed with endometriosis, and was tried on several different forms of birth control.  She reports that she works out at [employer] 2x per week and her home gym once per week.  She does meal prep with low carb and high protein.   She drinks lots of water daily.  She has tried detoxing without relief. She is also very active with snowboarding and hiking.   She reports that her weight fluctuates from 193-203 lbs, but she is unable to get under 192 lbs despite diet and exercise.  She does report some cold intolerance. Denies hair loss, heart palpitations.

## 2022-02-23 NOTE — PROGRESS NOTES
"Subjective:     CC: The encounter diagnosis was Weight gain.    HPI:   Gretel presents today with the following:    Weight gain  Patient reports that she always been overweight, but she has been trying recently to lose weight (about 1.5 years) and has not seen a significant improvement.   She reports that her weight increased after she was diagnosed with endometriosis, and was tried on several different forms of birth control.  She reports that she works out at [employer] 2x per week and her home gym once per week.  She does meal prep with low carb and high protein.   She drinks lots of water daily.  She has tried detoxing without relief. She is also very active with snowboarding and hiking.   She reports that her weight fluctuates from 193-203 lbs, but she is unable to get under 192 lbs despite diet and exercise.  She does report some cold intolerance. Denies hair loss, heart palpitations.     ROS:   Gen: no fevers/chills, no changes in weight  Eyes: no changes in vision  ENT: no sore throat, no hearing loss, no bloody nose  Pulm: no sob, no cough  CV: no chest pain, no palpitations  GI: no nausea/vomiting, no diarrhea  : no dysuria  MSk: no myalgias  Skin: no rash  Neuro: no headaches, no numbness/tingling  Heme/Lymph: no easy bruising        - NOTE: All other systems reviewed and are negative, except as in HPI.    Objective:     Exam: /70 (BP Location: Right arm, Patient Position: Sitting, BP Cuff Size: Adult)   Pulse 74   Temp 36.5 °C (97.7 °F)   Resp 14   Ht 1.753 m (5' 9\")   Wt 90.4 kg (199 lb 3.2 oz)   SpO2 95%  Body mass index is 29.42 kg/m².    Constitutional: Alert, no distress, well-groomed.  Skin: Warm, dry, good turgor, no rashes in visible areas.  Eye: Equal, round and reactive, conjunctiva clear, lids normal.  ENMT: Lips without lesions, good dentition, moist mucous membranes.  Neck: Trachea midline, no masses, no thyromegaly.  Respiratory: Unlabored respiratory effort, no cough. " Clear to ausculation. No rales, ronchi, or wheezing.  Cardiovascular: Regular rate and rhythm without murmur. Carotid and radial pulses are intact and equal bilaterally.  MSK: Normal gait, moves all extremities.  Neuro: Grossly non-focal.   Psych: Alert and oriented x3, normal affect and mood.    Assessment & Plan:     21 y.o. female with the following -     1. Weight gain  Patient's weight was discussed today, including healthy low-carb diet, 30-minutes of moderate exercise daily and avoiding sugars and high-fat foods. Patient would like to see Dr. Osullivan for weight management at this clinic. Labs ordered. Continue to monitor.    - TSH WITH REFLEX TO FT4; Future

## 2022-02-24 ENCOUNTER — HOSPITAL ENCOUNTER (OUTPATIENT)
Dept: LAB | Facility: MEDICAL CENTER | Age: 22
End: 2022-02-24
Attending: NURSE PRACTITIONER
Payer: COMMERCIAL

## 2022-02-24 DIAGNOSIS — R63.5 WEIGHT GAIN: ICD-10-CM

## 2022-02-24 LAB — TSH SERPL DL<=0.005 MIU/L-ACNC: 1.2 UIU/ML (ref 0.38–5.33)

## 2022-02-24 PROCEDURE — 36415 COLL VENOUS BLD VENIPUNCTURE: CPT

## 2022-02-24 PROCEDURE — 84443 ASSAY THYROID STIM HORMONE: CPT

## 2022-03-10 ENCOUNTER — OFFICE VISIT (OUTPATIENT)
Dept: MEDICAL GROUP | Facility: LAB | Age: 22
End: 2022-03-10
Payer: COMMERCIAL

## 2022-03-10 VITALS
WEIGHT: 196 LBS | DIASTOLIC BLOOD PRESSURE: 70 MMHG | RESPIRATION RATE: 16 BRPM | SYSTOLIC BLOOD PRESSURE: 110 MMHG | BODY MASS INDEX: 29.03 KG/M2 | TEMPERATURE: 97.4 F | HEART RATE: 66 BPM | OXYGEN SATURATION: 99 % | HEIGHT: 69 IN

## 2022-03-10 DIAGNOSIS — E66.3 OVERWEIGHT (BMI 25.0-29.9): ICD-10-CM

## 2022-03-10 DIAGNOSIS — R63.5 WEIGHT GAIN: ICD-10-CM

## 2022-03-10 DIAGNOSIS — E66.3 OVER WEIGHT: ICD-10-CM

## 2022-03-10 PROCEDURE — 99214 OFFICE O/P EST MOD 30 MIN: CPT | Performed by: FAMILY MEDICINE

## 2022-03-11 NOTE — PROGRESS NOTES
First initial weight management visit    Chief Complaint:       Chief Complaint   Patient presents with   • Weight Check     Weight management initial        HPI: Established patient, new to me  Gretel Aguilar is a 21 y.o. female who presents for weight management         Overweight (BMI 25.0-29.9)    History of present illness:  Reviewed with her today the following:  Patient said she started struggling with her weight 3 years ago, after starting medication for treatment of her endometriosis, tried different regimens, including meal prep, exercise.  Goal:   Normal BMI, she would like to have her weight at high school which was around 150 pounds      Diet: Not sure about how much calories she eats per day but she eats high-protein diet by drinking 2 shakes per day  Exercise and physical activity: Exercising regularly around 120 minutes/week    Fluids intake and water: No specific calculation for her water intake    Medications: Patient on Orilissa for treatment of endometriosis      Previous trials for weight loss: As mentioned above    Behavioral/emotional history:Challenges and barriers to weight loss:  No specific challenges physical or emotional  Positive for binge eating  No suspicion for sleep apnea  Lab review:    Reviewed labs, ordered CMP today.  Normal thyroid function    Past medical history, family history, social history and medications reviewed and updated in the record.  Today  Current medications, problem list and allergies reviewed in [facility] today  Health maintenance topics are reviewed and updated.    Patient Active Problem List    Diagnosis Date Noted   • Overweight (BMI 25.0-29.9) 03/10/2022   • Weight gain 02/22/2022   • Diarrhea 06/07/2021   • Benign skin mole 06/07/2021   • Exercise-induced asthma 06/07/2021   • Migraine 10/03/2018   • Endometriosis determined by laparoscopy 02/14/2014     Family History   Problem Relation Age of Onset   • Heart Disease Maternal Grandmother    • Diabetes Paternal  Grandmother    • Cancer Paternal Grandfather         liver, skin     Social History     Socioeconomic History   • Marital status: Single     Spouse name: Not on file   • Number of children: Not on file   • Years of education: Not on file   • Highest education level: Not on file   Occupational History   • Not on file   Tobacco Use   • Smoking status: Never Smoker   • Smokeless tobacco: Never Used   Vaping Use   • Vaping Use: Never used   Substance and Sexual Activity   • Alcohol use: Yes     Alcohol/week: 1.2 oz     Types: 2 Standard drinks or equivalent per week     Comment: 2-3 a week    • Drug use: Never   • Sexual activity: Yes     Partners: Male     Birth control/protection: I.U.D.   Other Topics Concern   • Not on file   Social History Narrative   • Not on file     Social Determinants of Health     Financial Resource Strain: Not on file   Food Insecurity: Not on file   Transportation Needs: Not on file   Physical Activity: Not on file   Stress: Not on file   Social Connections: Not on file   Intimate Partner Violence: Not on file   Housing Stability: Not on file     Current Outpatient Medications   Medication Sig Dispense Refill   • ondansetron (ZOFRAN) 4 MG Tab tablet Take 1 Tablet by mouth every 6 hours as needed for Nausea/Vomiting. 20 Each 0   • Levonorgestrel (LILETTA, 52 MG, IU) by Intrauterine route.     • rizatriptan (MAXALT) 10 MG tablet Take 1 Tablet by mouth one time as needed for Migraine for up to 1 dose. 10 Tablet 11   • ORILISSA 150 MG Tab Take 150 mg by mouth every day. TAKE 1 TABLET BY MOUTH DAILY     • albuterol 108 (90 Base) MCG/ACT Aero Soln inhalation aerosol Inhale 2 Puffs every 6 hours as needed for Shortness of Breath. 6.7 g 2   • ibuprofen (MOTRIN) 800 MG Tab Take 1 tablet by mouth every 8 hours as needed. 30 tablet 3     No current facility-administered medications for this visit.           Review Of Systems  As documented in HPI above  PHYSICAL EXAMINATION:    /70 (BP Location:  "Right arm, Patient Position: Sitting, BP Cuff Size: Adult)   Pulse 66   Temp 36.3 °C (97.4 °F) (Temporal)   Resp 16   Ht 1.753 m (5' 9\")   Wt 88.9 kg (196 lb) Comment: 39.5i  SpO2 99%   BMI 28.94 kg/m²   Gen.: Well-developed, well-nourished, no apparent distress, pleasant and cooperative with the examination  HEENT: Normocephalic/atraumatic,   Extremities: No cyanosis, clubbing or edema        ASSESSMENT/Plan:  1. Weight gain  Comp Metabolic Panel   2. Over weight   chronic problem, new to me  Discussed plan as follow:  barrier can include the Orilissa which can have side effects of weight gain, patient will discuss with her gynecology other alternatives for treatment of her endometriosis that will not affect her weight.  Diet: After calculating patient BMR which is around 1800 advised to cut back 500 sylvia/day from her BMR calculated, which will put her on a range of 1200 to 1500 sylvia/day  Protein around 60 to 70 g/day  Diet mainly consistent of high-protein, low carb, low fiber, low fat.  Patient to take 3 meals and 1 snack per day and she can increase it to 2 snacks if she is feeling hungry.  Mainly the breakfast and lunch would be meal substitute, patient preferred to use protein bar and protein shake, discussed with the patient the criteria to choose her protein shakes or bars for calorie protein ratio as 10:1.  Fluids intake will advise water around 80 ounces per day  Avoid juices and sodas, allowed for diet soda occasionally, no artificial sweeteners  Exercise   4-5 times a week 30 minutes of walk will increase gradually, no barriers for exercise activity  Comp Metabolic Panel   3. Overweight (BMI 25.0-29.9)   as above       Please note that this dictation was created using voice recognition software. I have made every reasonable attempt to correct obvious errors but there may be errors of grammar and content that I may have overlooked prior to finalization of this note.     I spent > 30 min with patient " face to face ,> 50% of time spent counseling , coordinating care, reviewing records , and educating patient .

## 2022-03-16 DIAGNOSIS — N80.9 ENDOMETRIOSIS DETERMINED BY LAPAROSCOPY: ICD-10-CM

## 2022-03-16 RX ORDER — RIZATRIPTAN BENZOATE 10 MG/1
10 TABLET ORAL
Qty: 10 TABLET | Refills: 11 | Status: SHIPPED | OUTPATIENT
Start: 2022-03-16 | End: 2023-03-30 | Stop reason: SDUPTHER

## 2022-03-16 RX ORDER — IBUPROFEN 800 MG/1
800 TABLET ORAL EVERY 8 HOURS PRN
Qty: 30 TABLET | Refills: 3 | Status: SHIPPED | OUTPATIENT
Start: 2022-03-16 | End: 2022-04-06

## 2022-03-16 NOTE — TELEPHONE ENCOUNTER
Received request via: Pharmacy    Was the patient seen in the last year in this department? Yes  3/10/2022  Does the patient have an active prescription (recently filled or refills available) for medication(s) requested? No

## 2022-03-29 ENCOUNTER — HOSPITAL ENCOUNTER (OUTPATIENT)
Dept: LAB | Facility: MEDICAL CENTER | Age: 22
End: 2022-03-29
Attending: FAMILY MEDICINE
Payer: COMMERCIAL

## 2022-03-29 DIAGNOSIS — E66.3 OVER WEIGHT: ICD-10-CM

## 2022-03-29 DIAGNOSIS — R63.5 WEIGHT GAIN: ICD-10-CM

## 2022-03-29 PROCEDURE — 36415 COLL VENOUS BLD VENIPUNCTURE: CPT

## 2022-03-29 PROCEDURE — 80053 COMPREHEN METABOLIC PANEL: CPT

## 2022-03-30 LAB
ALBUMIN SERPL BCP-MCNC: 4.4 G/DL (ref 3.2–4.9)
ALBUMIN/GLOB SERPL: 1.9 G/DL
ALP SERPL-CCNC: 85 U/L (ref 30–99)
ALT SERPL-CCNC: 18 U/L (ref 2–50)
ANION GAP SERPL CALC-SCNC: 11 MMOL/L (ref 7–16)
AST SERPL-CCNC: 21 U/L (ref 12–45)
BILIRUB SERPL-MCNC: 0.5 MG/DL (ref 0.1–1.5)
BUN SERPL-MCNC: 16 MG/DL (ref 8–22)
CALCIUM SERPL-MCNC: 9.9 MG/DL (ref 8.5–10.5)
CHLORIDE SERPL-SCNC: 104 MMOL/L (ref 96–112)
CO2 SERPL-SCNC: 23 MMOL/L (ref 20–33)
CREAT SERPL-MCNC: 0.72 MG/DL (ref 0.5–1.4)
GFR SERPLBLD CREATININE-BSD FMLA CKD-EPI: 120 ML/MIN/1.73 M 2
GLOBULIN SER CALC-MCNC: 2.3 G/DL (ref 1.9–3.5)
GLUCOSE SERPL-MCNC: 83 MG/DL (ref 65–99)
POTASSIUM SERPL-SCNC: 5 MMOL/L (ref 3.6–5.5)
PROT SERPL-MCNC: 6.7 G/DL (ref 6–8.2)
SODIUM SERPL-SCNC: 138 MMOL/L (ref 135–145)

## 2022-04-05 ENCOUNTER — OFFICE VISIT (OUTPATIENT)
Dept: MEDICAL GROUP | Facility: LAB | Age: 22
End: 2022-04-05
Payer: COMMERCIAL

## 2022-04-05 VITALS
BODY MASS INDEX: 28.58 KG/M2 | DIASTOLIC BLOOD PRESSURE: 70 MMHG | WEIGHT: 193 LBS | HEART RATE: 76 BPM | SYSTOLIC BLOOD PRESSURE: 96 MMHG | OXYGEN SATURATION: 99 % | HEIGHT: 69 IN | RESPIRATION RATE: 16 BRPM | TEMPERATURE: 97.8 F

## 2022-04-05 DIAGNOSIS — E66.3 OVERWEIGHT (BMI 25.0-29.9): ICD-10-CM

## 2022-04-05 PROCEDURE — 99213 OFFICE O/P EST LOW 20 MIN: CPT | Performed by: FAMILY MEDICINE

## 2022-04-05 RX ORDER — PHENTERMINE HYDROCHLORIDE 37.5 MG/1
37.5 CAPSULE ORAL EVERY MORNING
Qty: 30 CAPSULE | Refills: 0 | Status: SHIPPED | OUTPATIENT
Start: 2022-04-05 | End: 2022-05-02 | Stop reason: SDUPTHER

## 2022-04-05 ASSESSMENT — FIBROSIS 4 INDEX: FIB4 SCORE: 0.32

## 2022-04-05 NOTE — PROGRESS NOTES
Chief Complaint:   Chief Complaint   Patient presents with   • Weight Check       HPI: Established patient  Gretel Aguilar is a 21 y.o. female who presents for weight management visit    1. Overweight (BMI 25.0-29.9)  Patient lost around 4 pounds since last visit,    She said she has been restricting her calories between 12-15 100.  She continues to eat high-protein diet, exercising around 3 days a week, and drinking large amount of water.    Challenges/barriers: We can sometimes she has some temptations and she eats more,  Also reports binge eating sometimes and feeling hungry      Past medical history, family history, social history and medications reviewed and updated in the record.  Today  Current medications, problem list and allergies reviewed in [facility] today  Health maintenance topics are reviewed and updated.    Patient Active Problem List    Diagnosis Date Noted   • Overweight (BMI 25.0-29.9) 03/10/2022   • Weight gain 02/22/2022   • Diarrhea 06/07/2021   • Benign skin mole 06/07/2021   • Exercise-induced asthma 06/07/2021   • Migraine 10/03/2018   • Endometriosis determined by laparoscopy 02/14/2014     Family History   Problem Relation Age of Onset   • Heart Disease Maternal Grandmother    • Diabetes Paternal Grandmother    • Cancer Paternal Grandfather         liver, skin     Social History     Socioeconomic History   • Marital status: Single     Spouse name: Not on file   • Number of children: Not on file   • Years of education: Not on file   • Highest education level: Not on file   Occupational History   • Not on file   Tobacco Use   • Smoking status: Never Smoker   • Smokeless tobacco: Never Used   Vaping Use   • Vaping Use: Never used   Substance and Sexual Activity   • Alcohol use: Yes     Alcohol/week: 1.2 oz     Types: 2 Standard drinks or equivalent per week     Comment: 2-3 a week    • Drug use: Never   • Sexual activity: Yes     Partners: Male     Birth control/protection: I.U.D.   Other Topics  "Concern   • Not on file   Social History Narrative   • Not on file     Social Determinants of Health     Financial Resource Strain: Not on file   Food Insecurity: Not on file   Transportation Needs: Not on file   Physical Activity: Not on file   Stress: Not on file   Social Connections: Not on file   Intimate Partner Violence: Not on file   Housing Stability: Not on file     Current Outpatient Medications   Medication Sig Dispense Refill   • phentermine 37.5 MG capsule Take 1 Capsule by mouth every morning for 30 days. 30 Capsule 0   • ibuprofen (MOTRIN) 800 MG Tab Take 1 Tablet by mouth every 8 hours as needed. 30 Tablet 3   • rizatriptan (MAXALT) 10 MG tablet Take 1 Tablet by mouth one time as needed for Migraine for up to 1 dose. 10 Tablet 11   • ondansetron (ZOFRAN) 4 MG Tab tablet Take 1 Tablet by mouth every 6 hours as needed for Nausea/Vomiting. 20 Each 0   • Levonorgestrel (LILETTA, 52 MG, IU) by Intrauterine route.     • ORILISSA 150 MG Tab Take 150 mg by mouth every day. TAKE 1 TABLET BY MOUTH DAILY     • albuterol 108 (90 Base) MCG/ACT Aero Soln inhalation aerosol Inhale 2 Puffs every 6 hours as needed for Shortness of Breath. 6.7 g 2     No current facility-administered medications for this visit.           Review Of Systems  As documented in HPI above  PHYSICAL EXAMINATION:    BP (!) 96/70 (BP Location: Left arm, Patient Position: Sitting, BP Cuff Size: Adult)   Pulse 76   Temp 36.6 °C (97.8 °F) (Temporal)   Resp 16   Ht 1.753 m (5' 9\")   Wt 87.5 kg (193 lb)   SpO2 99%   BMI 28.50 kg/m²   Gen.: Well-developed, well-nourished, no apparent distress, pleasant and cooperative with the examination  HEENT: Normocephalic/atraumatic,     Neck: No JVD or bruits, no adenopathy  Cor: Regular rate and rhythm without murmur gallop or rub  Lungs: Clear to auscultation with equal breath sounds bilaterally. No wheezes, rhonchi.  Abdomen: Soft nontender without hepatosplenomegaly or masses appreciated, " normoactive bowel sounds  Extremities: No cyanosis, clubbing or edema        ASSESSMENT/Plan:  1. Overweight (BMI 25.0-29.9)   chronic, better controlled.  Congratulated on her weight loss.  Advised to start phentermine to help with appetite and increase metabolism.  Patient to continue with calorie deficiency around 1200 and exercise at least 150 minutes/week, water intake around 64 ounces and above, high-protein diet.  Follow-up in 2 weeks, medication side effects discussed with the patient today.  Patient is not losing 5% of her weight on the medication will consider discontinuation.  phentermine 37.5 MG capsule    Controlled Substance Treatment Agreement     Please note that this dictation was created using voice recognition software. I have made every reasonable attempt to correct obvious errors but there may be errors of grammar and content that I may have overlooked prior to finalization of this note.

## 2022-04-06 ENCOUNTER — TELEMEDICINE (OUTPATIENT)
Dept: MEDICAL GROUP | Facility: LAB | Age: 22
End: 2022-04-06
Payer: COMMERCIAL

## 2022-04-06 ENCOUNTER — TELEPHONE (OUTPATIENT)
Dept: MEDICAL GROUP | Facility: LAB | Age: 22
End: 2022-04-06
Payer: COMMERCIAL

## 2022-04-06 VITALS
WEIGHT: 192 LBS | HEIGHT: 69 IN | BODY MASS INDEX: 28.44 KG/M2 | DIASTOLIC BLOOD PRESSURE: 78 MMHG | OXYGEN SATURATION: 98 % | HEART RATE: 78 BPM | SYSTOLIC BLOOD PRESSURE: 110 MMHG

## 2022-04-06 DIAGNOSIS — G43.719 INTRACTABLE CHRONIC MIGRAINE WITHOUT AURA AND WITHOUT STATUS MIGRAINOSUS: ICD-10-CM

## 2022-04-06 PROCEDURE — 99213 OFFICE O/P EST LOW 20 MIN: CPT | Mod: 95 | Performed by: PHYSICIAN ASSISTANT

## 2022-04-06 RX ORDER — KETOROLAC TROMETHAMINE 10 MG/1
10 TABLET, FILM COATED ORAL EVERY 8 HOURS PRN
Qty: 9 TABLET | Refills: 0 | Status: SHIPPED | OUTPATIENT
Start: 2022-04-06 | End: 2022-04-09

## 2022-04-06 ASSESSMENT — FIBROSIS 4 INDEX: FIB4 SCORE: 0.32

## 2022-04-06 NOTE — TELEPHONE ENCOUNTER
"----- Message from FERMIN Fraser sent at 4/6/2022  2:15 PM PDT -----  Regarding: FW: Chronic Migraines   She will need to be seen. If her symptoms are drastically worsening, or considered to be \"the worst headache of my life\" she should be seen in the ER.   ----- Message -----  From: Adelaida Webb, Med Ass't  Sent: 4/6/2022  12:08 PM PDT  To: FERMIN Fraser  Subject: FW: Chronic Migraines                              ----- Message -----  From: Gretel Aguilar  Sent: 4/6/2022  12:07 PM PDT  To: Ramu Ariza Mg Kurtz  Subject: Chronic Migraines                                Good morning,     Sending this message in regards to my chronic migraines they have been subtle for a couple of years, but this last week, last night , and currently I’ve had the worse migraines where I had to max out on medications (rizatriptan, Tylenol, and ibuprofen) with no relief. The one last night it hurt so bad that I cried. The migraine I have currently makes me feel weak, sometimes dizziness. Black spots when I close my eyes. I don’t know what the next step will be in regards to these migraines if they are stress related or something else.    Thanks,   Gretel Aguilar       "

## 2022-04-06 NOTE — PROGRESS NOTES
Virtual Visit: Established Patient   This visit was conducted via Zoom using secure and encrypted videoconferencing technology.   The patient was in their home in the state of [place].    The patient's identity was confirmed and verbal consent was obtained for this virtual visit.    Subjective:   CC:   Chief Complaint   Patient presents with   • Migraine     Started week ago      Gretel Aguilar is a 21 y.o. female presenting for evaluation and management of:    Migraines  Started last night at 9:30pm. Took rizatriptan x 2, tylenol, ibuprofen, tried ice pack. No auras.  Patient gets migraines on average once per month.  She was previously on nortriptyline for migraine symptoms but discontinued this medication due to intolerable side effects.    + nausea, took zofran at midnight  + aura today  Tachy this AM  No photophobia, no phonophobia    1000mg tylenol this AM  800mg ibuprofen this afternoon.     Pain is improving but still there. Feel dizzy and weak.    Recently started phentermine.      ROS   ROS negative except as indicated above    Current medicines (including changes today)  Current Outpatient Medications   Medication Sig Dispense Refill   • ketorolac (TORADOL) 10 MG Tab Take 1 Tablet by mouth every 8 hours as needed for Severe Pain for up to 3 days. 9 Tablet 0   • phentermine 37.5 MG capsule Take 1 Capsule by mouth every morning for 30 days. 30 Capsule 0   • rizatriptan (MAXALT) 10 MG tablet Take 1 Tablet by mouth one time as needed for Migraine for up to 1 dose. 10 Tablet 11   • ondansetron (ZOFRAN) 4 MG Tab tablet Take 1 Tablet by mouth every 6 hours as needed for Nausea/Vomiting. 20 Each 0   • Levonorgestrel (LILETTA, 52 MG, IU) by Intrauterine route.     • ORILISSA 150 MG Tab Take 150 mg by mouth every day. TAKE 1 TABLET BY MOUTH DAILY     • albuterol 108 (90 Base) MCG/ACT Aero Soln inhalation aerosol Inhale 2 Puffs every 6 hours as needed for Shortness of Breath. 6.7 g 2     No current  "facility-administered medications for this visit.       Patient Active Problem List    Diagnosis Date Noted   • Overweight (BMI 25.0-29.9) 03/10/2022   • Weight gain 02/22/2022   • Diarrhea 06/07/2021   • Benign skin mole 06/07/2021   • Exercise-induced asthma 06/07/2021   • Migraine 10/03/2018   • Endometriosis determined by laparoscopy 02/14/2014        Objective:   /78   Pulse 78   Ht 1.753 m (5' 9\")   Wt 87.1 kg (192 lb)   SpO2 98%   BMI 28.35 kg/m²     Physical Exam:  Constitutional: Alert, no distress, well-groomed.  Skin: No rashes in visible areas.  Eye: Round. Conjunctiva clear, lids normal. No icterus.   ENMT: Lips pink without lesions, good dentition, moist mucous membranes. Phonation normal.  Neck: No masses, no thyromegaly. Moves freely without pain.  Respiratory: Unlabored respiratory effort, no cough or audible wheeze  Psych: Alert and oriented x3, normal affect and mood.     Assessment and Plan:   The following treatment plan was discussed:   1. Intractable chronic migraine without aura and without status migrainosus  Chronic condition  Discussed migraine medication and medication strategies  Short-term Rx toradol 10mg  Q 8 hours PRN x 3 days  If symptoms become more frequent consider neurology referral  Discussed red flags and indications for ER    Follow-up: Return if symptoms worsen or fail to improve.         "

## 2022-04-26 ENCOUNTER — OFFICE VISIT (OUTPATIENT)
Dept: MEDICAL GROUP | Facility: LAB | Age: 22
End: 2022-04-26
Payer: COMMERCIAL

## 2022-04-26 VITALS
HEIGHT: 69 IN | WEIGHT: 188 LBS | HEART RATE: 98 BPM | OXYGEN SATURATION: 98 % | BODY MASS INDEX: 27.85 KG/M2 | RESPIRATION RATE: 16 BRPM | SYSTOLIC BLOOD PRESSURE: 110 MMHG | DIASTOLIC BLOOD PRESSURE: 60 MMHG | TEMPERATURE: 97 F

## 2022-04-26 DIAGNOSIS — G43.709 CHRONIC MIGRAINE WITHOUT AURA WITHOUT STATUS MIGRAINOSUS, NOT INTRACTABLE: ICD-10-CM

## 2022-04-26 DIAGNOSIS — E66.3 OVERWEIGHT (BMI 25.0-29.9): ICD-10-CM

## 2022-04-26 PROCEDURE — 99214 OFFICE O/P EST MOD 30 MIN: CPT | Performed by: FAMILY MEDICINE

## 2022-04-26 RX ORDER — TOPIRAMATE SPINKLE 15 MG/1
15 CAPSULE ORAL 2 TIMES DAILY
Qty: 60 CAPSULE | Refills: 3 | Status: SHIPPED | OUTPATIENT
Start: 2022-04-26 | End: 2022-05-12

## 2022-04-26 ASSESSMENT — FIBROSIS 4 INDEX: FIB4 SCORE: 0.34

## 2022-04-26 NOTE — PROGRESS NOTES
Chief Complaint:   Chief Complaint   Patient presents with   • Weight Check       HPI: Established patient  Gretel Aguilar is a 22 y.o. female who presents for weight management    1. Chronic migraine without aura without status migrainosus, not intractable  History of chronic migraine headaches, uses usually triptan's to control the pain.  Discussed with the patient to start her on Topamax which can help with control of her migraine headache and also help with weight loss    2. Overweight (BMI 25.0-29.9)  Chronic,   patient lost around 5 pounds since last visit.  She said at home her weight is down to 182 when she does it in the morning with light clothing's.  Denies concerns or issues at this time.  Taking phentermine as directed denies side effects.  Stable vital signs, discussed with the patient to also start her on some Topamax which can help with weight loss and control of her migraine headache.      Past medical history, [id number] history, social history and medications reviewed and updated in the record. Today   Current medications, problem list and allergies reviewed in [facility] today   Health maintenance topics are reviewed and updated.    Patient Active Problem List    Diagnosis Date Noted   • Overweight (BMI 25.0-29.9) 03/10/2022   • Weight gain 02/22/2022   • Diarrhea 06/07/2021   • Benign skin mole 06/07/2021   • Exercise-induced asthma 06/07/2021   • Migraine 10/03/2018   • Endometriosis determined by laparoscopy 02/14/2014     Family History   Problem Relation Age of Onset   • Heart Disease Maternal Grandmother    • Diabetes Paternal Grandmother    • Cancer Paternal Grandfather         liver, skin     Social History     Socioeconomic History   • Marital status: Single     Spouse name: Not on file   • Number of children: Not on file   • Years of education: Not on file   • Highest education level: Not on file   Occupational History   • Not on file   Tobacco Use   • Smoking status: Never Smoker   • Smokeless  "tobacco: Never Used   Vaping Use   • Vaping Use: Never used   Substance and Sexual Activity   • Alcohol use: Yes     Alcohol/week: 1.2 oz     Types: 2 Standard drinks or equivalent per week     Comment: 2-3 a week    • Drug use: Never   • Sexual activity: Yes     Partners: Male     Birth control/protection: I.U.D.   Other Topics Concern   • Not on file   Social History Narrative   • Not on file     Social Determinants of Health     Financial Resource Strain: Not on file   Food Insecurity: Not on file   Transportation Needs: Not on file   Physical Activity: Not on file   Stress: Not on file   Social Connections: Not on file   Intimate Partner Violence: Not on file   Housing Stability: Not on file     Current Outpatient Medications   Medication Sig Dispense Refill   • topiramate (TOPOMAX) 15 MG CAPSULE SPRINKLE Take 1 Capsule by mouth 2 times a day. 60 Capsule 3   • phentermine 37.5 MG capsule Take 1 Capsule by mouth every morning for 30 days. 30 Capsule 0   • rizatriptan (MAXALT) 10 MG tablet Take 1 Tablet by mouth one time as needed for Migraine for up to 1 dose. 10 Tablet 11   • ondansetron (ZOFRAN) 4 MG Tab tablet Take 1 Tablet by mouth every 6 hours as needed for Nausea/Vomiting. 20 Each 0   • Levonorgestrel (LILETTA, 52 MG, IU) by Intrauterine route.     • ORILISSA 150 MG Tab Take 150 mg by mouth every day. TAKE 1 TABLET BY MOUTH DAILY     • albuterol 108 (90 Base) MCG/ACT Aero Soln inhalation aerosol Inhale 2 Puffs every 6 hours as needed for Shortness of Breath. 6.7 g 2     No current facility-administered medications for this visit.           Review Of Systems  As documented in HPI above  PHYSICAL EXAMINATION:    /60 (BP Location: Right arm, Patient Position: Sitting, BP Cuff Size: Adult)   Pulse 98   Temp 36.1 °C (97 °F) (Temporal)   Resp 16   Ht 1.753 m (5' 9\")   Wt 85.3 kg (188 lb)   SpO2 98%   BMI 27.76 kg/m²   Gen.: Well-developed, well-nourished, no apparent distress, pleasant and " cooperative with the examination  HEENT: Normocephalic/atraumatic, Neck: No JVD or bruits, no adenopathy  Cor: Regular rate and rhythm without murmur gallop or rub  Lungs: Clear to auscultation with equal breath sounds bilaterally. No wheezes, rhonchi.    Extremities: No cyanosis, clubbing or edema          ASSESSMENT/Plan:  1. Chronic migraine without aura without status migrainosus, not intractable   chronic, not well controlled.  We will add Topamax to help with both migraine control and weight loss  topiramate (TOPOMAX) 15 MG CAPSULE SPRINKLE   2. Overweight (BMI 25.0-29.9)    Chronic, lost around 5 pounds, congratulated on her weight loss, advised to continue the same regimen we discussed before of high-protein diet, increase water intake, exercise and calorie deficiency.  Continue to take phentermine and I will add Topamax to help with weight loss.  topiramate (TOPOMAX) 15 MG CAPSULE SPRINKLE       Please note that this dictation was created using voice recognition software. I have made every reasonable attempt to correct obvious errors but there may be errors of grammar and content that I may have overlooked prior to finalization of this note.

## 2022-04-28 ENCOUNTER — PATIENT MESSAGE (OUTPATIENT)
Dept: MEDICAL GROUP | Facility: LAB | Age: 22
End: 2022-04-28
Payer: COMMERCIAL

## 2022-04-28 DIAGNOSIS — R79.89 ABNORMAL CBC: ICD-10-CM

## 2022-05-03 ENCOUNTER — HOSPITAL ENCOUNTER (OUTPATIENT)
Dept: LAB | Facility: MEDICAL CENTER | Age: 22
End: 2022-05-03
Attending: NURSE PRACTITIONER
Payer: COMMERCIAL

## 2022-05-03 DIAGNOSIS — R79.89 ABNORMAL CBC: ICD-10-CM

## 2022-05-03 LAB
BASOPHILS # BLD AUTO: 0.7 % (ref 0–1.8)
BASOPHILS # BLD: 0.05 K/UL (ref 0–0.12)
EOSINOPHIL # BLD AUTO: 0.04 K/UL (ref 0–0.51)
EOSINOPHIL NFR BLD: 0.5 % (ref 0–6.9)
ERYTHROCYTE [DISTWIDTH] IN BLOOD BY AUTOMATED COUNT: 45.3 FL (ref 35.9–50)
HCT VFR BLD AUTO: 47.6 % (ref 37–47)
HGB BLD-MCNC: 16.2 G/DL (ref 12–16)
IMM GRANULOCYTES # BLD AUTO: 0.03 K/UL (ref 0–0.11)
IMM GRANULOCYTES NFR BLD AUTO: 0.4 % (ref 0–0.9)
LYMPHOCYTES # BLD AUTO: 2.61 K/UL (ref 1–4.8)
LYMPHOCYTES NFR BLD: 34.1 % (ref 22–41)
MCH RBC QN AUTO: 30.8 PG (ref 27–33)
MCHC RBC AUTO-ENTMCNC: 34 G/DL (ref 33.6–35)
MCV RBC AUTO: 90.5 FL (ref 81.4–97.8)
MONOCYTES # BLD AUTO: 0.65 K/UL (ref 0–0.85)
MONOCYTES NFR BLD AUTO: 8.5 % (ref 0–13.4)
NEUTROPHILS # BLD AUTO: 4.27 K/UL (ref 2–7.15)
NEUTROPHILS NFR BLD: 55.8 % (ref 44–72)
NRBC # BLD AUTO: 0 K/UL
NRBC BLD-RTO: 0 /100 WBC
PLATELET # BLD AUTO: 309 K/UL (ref 164–446)
PMV BLD AUTO: 9.2 FL (ref 9–12.9)
RBC # BLD AUTO: 5.26 M/UL (ref 4.2–5.4)
WBC # BLD AUTO: 7.7 K/UL (ref 4.8–10.8)

## 2022-05-03 PROCEDURE — 36415 COLL VENOUS BLD VENIPUNCTURE: CPT

## 2022-05-03 PROCEDURE — 85025 COMPLETE CBC W/AUTO DIFF WBC: CPT

## 2022-05-12 ENCOUNTER — OFFICE VISIT (OUTPATIENT)
Dept: MEDICAL GROUP | Facility: LAB | Age: 22
End: 2022-05-12
Payer: COMMERCIAL

## 2022-05-12 VITALS
BODY MASS INDEX: 26.96 KG/M2 | HEART RATE: 98 BPM | DIASTOLIC BLOOD PRESSURE: 70 MMHG | TEMPERATURE: 97.7 F | SYSTOLIC BLOOD PRESSURE: 120 MMHG | RESPIRATION RATE: 16 BRPM | HEIGHT: 69 IN | OXYGEN SATURATION: 97 % | WEIGHT: 182 LBS

## 2022-05-12 DIAGNOSIS — E66.3 OVERWEIGHT (BMI 25.0-29.9): ICD-10-CM

## 2022-05-12 PROCEDURE — 99213 OFFICE O/P EST LOW 20 MIN: CPT | Performed by: FAMILY MEDICINE

## 2022-05-12 ASSESSMENT — FIBROSIS 4 INDEX: FIB4 SCORE: 0.37

## 2022-05-13 NOTE — PROGRESS NOTES
Chief Complaint:   Chief Complaint   Patient presents with   • Weight Check     2 week       HPI:Established patient    Gretel Aguilar is a 22 y.o. female who presents for follow-up on weight.    Overweight (BMI 25.0-29.9)    Patient lost overall 14 pounds since beginning of the program, 6 pounds since last visit.  On phentermine and Topamax, denies side effects except for some itching since she started the Topamax.    Reviewed vital signs, pulse rate is a little bit on the high side around 98, denies palpitations or chest pain or shortness of breath or dizziness.  Patient said she feels just more anxious today because she just came from her exam.    Continues to keep calories around 1300 sylvia/day, she drinks around 64 ounces of water daily, and 60 to 70 g of protein daily, along with 150 minutes of exercise per week.    Past medical history, family history, social history and medications reviewed and updated in the record. Today   Current medications, problem list and allergies reviewed in Epic today   Health maintenance topics are reviewed and updated.    Patient Active Problem List    Diagnosis Date Noted   • Overweight (BMI 25.0-29.9) 03/10/2022   • Weight gain 02/22/2022   • Diarrhea 06/07/2021   • Benign skin mole 06/07/2021   • Exercise-induced asthma 06/07/2021   • Migraine 10/03/2018   • Endometriosis determined by laparoscopy 02/14/2014     Family History   Problem Relation Age of Onset   • Heart Disease Maternal Grandmother    • Diabetes Paternal Grandmother    • Cancer Paternal Grandfather         liver, skin     Social History     Socioeconomic History   • Marital status: Single     Spouse name: Not on file   • Number of children: Not on file   • Years of education: Not on file   • Highest education level: Not on file   Occupational History   • Not on file   Tobacco Use   • Smoking status: Never Smoker   • Smokeless tobacco: Never Used   Vaping Use   • Vaping Use: Never used   Substance and Sexual  "Activity   • Alcohol use: Yes     Alcohol/week: 1.2 oz     Types: 2 Standard drinks or equivalent per week     Comment: 2-3 a week    • Drug use: Never   • Sexual activity: Yes     Partners: Male     Birth control/protection: I.U.D.   Other Topics Concern   • Not on file   Social History Narrative   • Not on file     Social Determinants of Health     Financial Resource Strain: Not on file   Food Insecurity: Not on file   Transportation Needs: Not on file   Physical Activity: Not on file   Stress: Not on file   Social Connections: Not on file   Intimate Partner Violence: Not on file   Housing Stability: Not on file     Current Outpatient Medications   Medication Sig Dispense Refill   • phentermine 37.5 MG capsule Take 1 Capsule by mouth every morning for 30 days. 30 Capsule 0   • rizatriptan (MAXALT) 10 MG tablet Take 1 Tablet by mouth one time as needed for Migraine for up to 1 dose. 10 Tablet 11   • ondansetron (ZOFRAN) 4 MG Tab tablet Take 1 Tablet by mouth every 6 hours as needed for Nausea/Vomiting. 20 Each 0   • Levonorgestrel (LILETTA, 52 MG, IU) by Intrauterine route.     • ORILISSA 150 MG Tab Take 150 mg by mouth every day. TAKE 1 TABLET BY MOUTH DAILY     • albuterol 108 (90 Base) MCG/ACT Aero Soln inhalation aerosol Inhale 2 Puffs every 6 hours as needed for Shortness of Breath. 6.7 g 2     No current facility-administered medications for this visit.         Review Of Systems  As documented in HPI above  PHYSICAL EXAMINATION:    /70 (BP Location: Right arm, Patient Position: Sitting, BP Cuff Size: Adult)   Pulse 98   Temp 36.5 °C (97.7 °F) (Temporal)   Resp 16   Ht 1.753 m (5' 9\")   Wt 82.6 kg (182 lb)   SpO2 97%   BMI 26.88 kg/m²   Gen.: Well-developed, well-nourished, no apparent distress, pleasant and cooperative with the examination  HEENT: Normocephalic/atraumatic,    Neck: No JVD or bruits, no adenopathy  Cor: Regular rate and rhythm without murmur gallop or rub  Lungs: Clear to " auscultation with equal breath sounds bilaterally. No wheezes, rhonchi.  Abdomen: Soft nontender without hepatosplenomegaly or masses appreciated, normoactive bowel sounds  Extremities: No cyanosis, clubbing or edema          ASSESSMENT/Plan:  1. Overweight (BMI 25.0-29.9)   chronic, better controlled, lost around 14 pounds since the beginning of March.  Advised to continue with calorie deficiency around 1500 and below daily, exercise least a minimum of 150 minutes/week, keep water intake around 664 ounces daily, and high-protein diet above 70 sylvia/day.  Follow-up in 1 month.  Discontinued Topamax because of side effects itching.  Continue with phentermine only.  Denies side effects from phentermine.       Please note that this dictation was created using voice recognition software. I have made every reasonable attempt to correct obvious errors but there may be errors of grammar and content that I may have overlooked prior to finalization of this note.

## 2022-06-16 ENCOUNTER — OFFICE VISIT (OUTPATIENT)
Dept: MEDICAL GROUP | Facility: LAB | Age: 22
End: 2022-06-16
Payer: COMMERCIAL

## 2022-06-16 VITALS
SYSTOLIC BLOOD PRESSURE: 110 MMHG | DIASTOLIC BLOOD PRESSURE: 60 MMHG | WEIGHT: 185 LBS | BODY MASS INDEX: 27.4 KG/M2 | HEIGHT: 69 IN

## 2022-06-16 DIAGNOSIS — E66.3 OVERWEIGHT (BMI 25.0-29.9): ICD-10-CM

## 2022-06-16 PROCEDURE — 99213 OFFICE O/P EST LOW 20 MIN: CPT | Performed by: FAMILY MEDICINE

## 2022-06-16 RX ORDER — PHENTERMINE HYDROCHLORIDE 37.5 MG/1
37.5 CAPSULE ORAL EVERY MORNING
Qty: 30 CAPSULE | Refills: 0 | Status: SHIPPED | OUTPATIENT
Start: 2022-06-16 | End: 2022-07-16

## 2022-06-16 ASSESSMENT — PATIENT HEALTH QUESTIONNAIRE - PHQ9: CLINICAL INTERPRETATION OF PHQ2 SCORE: 0

## 2022-06-16 ASSESSMENT — FIBROSIS 4 INDEX: FIB4 SCORE: 0.37

## 2022-06-17 NOTE — PROGRESS NOTES
Chief Complaint:   Chief Complaint   Patient presents with   • Weight Check       Hpi  Gretel Aguilar is a 22 y.o. female who presents for follow-up on weight management     Overweight (BMI 25.0-29.9)  Patient gained around 3 pounds since last visit, she relates it to being on vacation and not following her routine that we discussed before.  Patient said she started 2 days ago when she already lost 2 pounds since her vacation weight.    Total weight loss since the beginning of the program around 15 pounds.  Would like to refill her phentermine to restart it.  Denies side effects of the medications,    Patient is on IUD so there is low possibility she is pregnant at this time she is having regular.  Past medical history, family history, social history and medications reviewed and updated in the record.  Today  Current medications, problem list and allergies reviewed in EPIC today health maintenance topics are reviewed and updated.    Patient Active Problem List    Diagnosis Date Noted   • Overweight (BMI 25.0-29.9) 03/10/2022   • Weight gain 02/22/2022   • Diarrhea 06/07/2021   • Benign skin mole 06/07/2021   • Exercise-induced asthma 06/07/2021   • Migraine 10/03/2018   • Endometriosis determined by laparoscopy 02/14/2014     Family History   Problem Relation Age of Onset   • Heart Disease Maternal Grandmother    • Diabetes Paternal Grandmother    • Cancer Paternal Grandfather         liver, skin     Social History     Socioeconomic History   • Marital status: Single     Spouse name: Not on file   • Number of children: Not on file   • Years of education: Not on file   • Highest education level: Not on file   Occupational History   • Not on file   Tobacco Use   • Smoking status: Never Smoker   • Smokeless tobacco: Never Used   Vaping Use   • Vaping Use: Never used   Substance and Sexual Activity   • Alcohol use: Yes     Alcohol/week: 1.2 oz     Types: 2 Standard drinks or equivalent per week     Comment: 2-3 a  "week    • Drug use: Never   • Sexual activity: Yes     Partners: Male     Birth control/protection: I.U.D.   Other Topics Concern   • Not on file   Social History Narrative   • Not on file     Social Determinants of Health     Financial Resource Strain: Not on file   Food Insecurity: Not on file   Transportation Needs: Not on file   Physical Activity: Not on file   Stress: Not on file   Social Connections: Not on file   Intimate Partner Violence: Not on file   Housing Stability: Not on file     Current Outpatient Medications   Medication Sig Dispense Refill   • phentermine 37.5 MG capsule Take 1 Capsule by mouth every morning for 30 days. 30 Capsule 0   • rizatriptan (MAXALT) 10 MG tablet Take 1 Tablet by mouth one time as needed for Migraine for up to 1 dose. 10 Tablet 11   • ondansetron (ZOFRAN) 4 MG Tab tablet Take 1 Tablet by mouth every 6 hours as needed for Nausea/Vomiting. 20 Each 0   • Levonorgestrel (LILETTA, 52 MG, IU) by Intrauterine route.     • albuterol 108 (90 Base) MCG/ACT Aero Soln inhalation aerosol Inhale 2 Puffs every 6 hours as needed for Shortness of Breath. 6.7 g 2     No current facility-administered medications for this visit.           Review Of Systems  As documented in HPI above  PHYSICAL EXAMINATION:    /60 (BP Location: Right arm, Patient Position: Sitting, BP Cuff Size: Adult)   Ht 1.753 m (5' 9\")   Wt 83.9 kg (185 lb)   BMI 27.32 kg/m²   Gen.: Well-developed, well-nourished, no apparent distress, pleasant and cooperative with the examination  HEENT: Normocephalic/atraumatic, Neck: No JVD or bruits, no adenopathy  Cor: Regular rate and rhythm without murmur gallop or rub  Lungs: Clear to auscultation with equal breath sounds bilaterally. No wheezes, rhonchi.    Extremities: No cyanosis, clubbing or edema          ASSESSMENT/Plan:  1. Overweight (BMI 25.0-29.9)   ongoing problem, patient advised to restart calorie restriction to 1200 sylvia/day, high-protein diet minimum of 70 " g/day, increase water intake restart exercising, refilled medication today, follow-up for weight management as directed.    phentermine 37.5 MG capsule       Please note that this dictation was created using voice recognition software. I have made every reasonable attempt to correct obvious errors but there may be errors of grammar and content that I may have overlooked prior to finalization of this note.

## 2022-07-26 ENCOUNTER — APPOINTMENT (OUTPATIENT)
Dept: MEDICAL GROUP | Facility: LAB | Age: 22
End: 2022-07-26
Payer: COMMERCIAL

## 2022-08-11 ENCOUNTER — OFFICE VISIT (OUTPATIENT)
Dept: MEDICAL GROUP | Facility: LAB | Age: 22
End: 2022-08-11
Payer: COMMERCIAL

## 2022-08-11 VITALS
WEIGHT: 179 LBS | RESPIRATION RATE: 16 BRPM | SYSTOLIC BLOOD PRESSURE: 110 MMHG | HEIGHT: 69 IN | DIASTOLIC BLOOD PRESSURE: 60 MMHG | OXYGEN SATURATION: 100 % | TEMPERATURE: 97 F | HEART RATE: 66 BPM | BODY MASS INDEX: 26.51 KG/M2

## 2022-08-11 DIAGNOSIS — E66.3 OVERWEIGHT (BMI 25.0-29.9): ICD-10-CM

## 2022-08-11 PROCEDURE — 99213 OFFICE O/P EST LOW 20 MIN: CPT | Performed by: FAMILY MEDICINE

## 2022-08-11 ASSESSMENT — FIBROSIS 4 INDEX: FIB4 SCORE: 0.37

## 2022-08-11 NOTE — PROGRESS NOTES
Chief Complaint:   Chief Complaint   Patient presents with    Weight Check       HPI: Established patient  Gretel Aguilar is a 22 y.o. female who presents for weight check    1. Overweight (BMI 25.0-29.9)  Lost around 5 pounds since last visit,  Not using phentermine at this time she said she only uses it as needed  Had flulike symptoms around 2 weeks ago and that is why she changed her routine and did not lose much, and she feels still very tired and fatigued  Continues with calorie deficiency limited to 1200 kcal/day along with hydration and less exercise because of her recent sickness.  On high-protein diet.      Denies concerns or side effects from medication    Past medical history, family history, social history and medications reviewed and updated in the record.  Today  Current medications, problem list and allergies reviewed in [facility] today  Health maintenance topics are reviewed and updated.    Patient Active Problem List    Diagnosis Date Noted    Overweight (BMI 25.0-29.9) 03/10/2022    Weight gain 02/22/2022    Diarrhea 06/07/2021    Benign skin mole 06/07/2021    Exercise-induced asthma 06/07/2021    Migraine 10/03/2018    Endometriosis determined by laparoscopy 02/14/2014     Family History   Problem Relation Age of Onset    Heart Disease Maternal Grandmother     Diabetes Paternal Grandmother     Cancer Paternal Grandfather         liver, skin     Social History     Socioeconomic History    Marital status: Single     Spouse name: Not on file    Number of children: Not on file    Years of education: Not on file    Highest education level: Not on file   Occupational History    Not on file   Tobacco Use    Smoking status: Never    Smokeless tobacco: Never   Vaping Use    Vaping Use: Never used   Substance and Sexual Activity    Alcohol use: Yes     Alcohol/week: 1.2 oz     Types: 2 Standard drinks or equivalent per week     Comment: 2-3 a week     Drug use: Never    Sexual activity: Yes     Partners:  "Male     Birth control/protection: I.U.D.   Other Topics Concern    Not on file   Social History Narrative    Not on file     Social Determinants of Health     Financial Resource Strain: Not on file   Food Insecurity: Not on file   Transportation Needs: Not on file   Physical Activity: Not on file   Stress: Not on file   Social Connections: Not on file   Intimate Partner Violence: Not on file   Housing Stability: Not on file     Current Outpatient Medications   Medication Sig Dispense Refill    rizatriptan (MAXALT) 10 MG tablet Take 1 Tablet by mouth one time as needed for Migraine for up to 1 dose. 10 Tablet 11    ondansetron (ZOFRAN) 4 MG Tab tablet Take 1 Tablet by mouth every 6 hours as needed for Nausea/Vomiting. 20 Each 0    Levonorgestrel (LILETTA, 52 MG, IU) by Intrauterine route.      albuterol 108 (90 Base) MCG/ACT Aero Soln inhalation aerosol Inhale 2 Puffs every 6 hours as needed for Shortness of Breath. 6.7 g 2     No current facility-administered medications for this visit.           Review Of Systems  As documented in HPI above  PHYSICAL EXAMINATION:    /60 (BP Location: Left arm, Patient Position: Sitting, BP Cuff Size: Small adult)   Pulse 66   Temp 36.1 °C (97 °F) (Temporal)   Resp 16   Ht 1.753 m (5' 9\")   Wt 81.2 kg (179 lb)   SpO2 100%   BMI 26.43 kg/m²   Gen.: Well-developed, well-nourished, no apparent distress, pleasant and cooperative with the examination  HEENT: Normocephalic/atraumatic,   Neck: No JVD or bruits, no adenopathy  Cor: Regular rate and rhythm without murmur gallop or rub  Lungs: Clear to auscultation with equal breath sounds bilaterally. No wheezes, rhonchi.  Abdomen: Soft nontender without hepatosplenomegaly or masses appreciated, normoactive bowel sounds  Extremities: No cyanosis, clubbing or edema       ASSESSMENT/Plan:    1. Overweight (BMI 25.0-29.9)  Ongoing, continues to lose weight, encouraged and congratulated on her weight loss  Continue calorie " deficiency, hydration high-protein diet as we discussed before along with exercise minimum of 150 minutes/week.  Follow-up in 1 month if fatigue persists will order lab work next visit most likely her fatigue from her recent recovery from viral symptoms         Please note that this dictation was created using voice recognition software. I have made every reasonable attempt to correct obvious errors but there may be errors of grammar and content that I may have overlooked prior to finalization of this note.

## 2022-08-23 ENCOUNTER — OFFICE VISIT (OUTPATIENT)
Dept: MEDICAL GROUP | Facility: LAB | Age: 22
End: 2022-08-23
Payer: COMMERCIAL

## 2022-08-23 VITALS
HEIGHT: 69 IN | TEMPERATURE: 97.5 F | OXYGEN SATURATION: 96 % | SYSTOLIC BLOOD PRESSURE: 116 MMHG | DIASTOLIC BLOOD PRESSURE: 68 MMHG | WEIGHT: 176.2 LBS | RESPIRATION RATE: 14 BRPM | BODY MASS INDEX: 26.1 KG/M2 | HEART RATE: 76 BPM

## 2022-08-23 DIAGNOSIS — Z02.89 ENCOUNTER FOR COMPLETION OF FORM WITH PATIENT: ICD-10-CM

## 2022-08-23 PROBLEM — D22.9 BENIGN SKIN MOLE: Status: RESOLVED | Noted: 2021-06-07 | Resolved: 2022-08-23

## 2022-08-23 PROBLEM — J45.990 EXERCISE-INDUCED ASTHMA: Status: RESOLVED | Noted: 2021-06-07 | Resolved: 2022-08-23

## 2022-08-23 PROBLEM — R19.7 DIARRHEA: Status: RESOLVED | Noted: 2021-06-07 | Resolved: 2022-08-23

## 2022-08-23 PROCEDURE — 99212 OFFICE O/P EST SF 10 MIN: CPT | Performed by: NURSE PRACTITIONER

## 2022-08-23 RX ORDER — PHENTERMINE HYDROCHLORIDE 37.5 MG/1
37.5 CAPSULE ORAL EVERY MORNING
COMMUNITY
End: 2022-09-13 | Stop reason: SDUPTHER

## 2022-08-23 RX ORDER — IBUPROFEN 800 MG/1
800 TABLET ORAL EVERY 8 HOURS PRN
COMMUNITY
End: 2023-08-10

## 2022-08-23 SDOH — ECONOMIC STABILITY: INCOME INSECURITY: IN THE LAST 12 MONTHS, WAS THERE A TIME WHEN YOU WERE NOT ABLE TO PAY THE MORTGAGE OR RENT ON TIME?: NO

## 2022-08-23 SDOH — ECONOMIC STABILITY: FOOD INSECURITY: WITHIN THE PAST 12 MONTHS, YOU WORRIED THAT YOUR FOOD WOULD RUN OUT BEFORE YOU GOT MONEY TO BUY MORE.: NEVER TRUE

## 2022-08-23 SDOH — ECONOMIC STABILITY: HOUSING INSECURITY: IN THE LAST 12 MONTHS, HOW MANY PLACES HAVE YOU LIVED?: 1

## 2022-08-23 SDOH — ECONOMIC STABILITY: TRANSPORTATION INSECURITY
IN THE PAST 12 MONTHS, HAS LACK OF TRANSPORTATION KEPT YOU FROM MEETINGS, WORK, OR FROM GETTING THINGS NEEDED FOR DAILY LIVING?: NO

## 2022-08-23 SDOH — ECONOMIC STABILITY: HOUSING INSECURITY
IN THE LAST 12 MONTHS, WAS THERE A TIME WHEN YOU DID NOT HAVE A STEADY PLACE TO SLEEP OR SLEPT IN A SHELTER (INCLUDING NOW)?: NO

## 2022-08-23 SDOH — HEALTH STABILITY: PHYSICAL HEALTH: ON AVERAGE, HOW MANY MINUTES DO YOU ENGAGE IN EXERCISE AT THIS LEVEL?: 60 MIN

## 2022-08-23 SDOH — ECONOMIC STABILITY: FOOD INSECURITY: WITHIN THE PAST 12 MONTHS, THE FOOD YOU BOUGHT JUST DIDN'T LAST AND YOU DIDN'T HAVE MONEY TO GET MORE.: NEVER TRUE

## 2022-08-23 SDOH — ECONOMIC STABILITY: TRANSPORTATION INSECURITY
IN THE PAST 12 MONTHS, HAS THE LACK OF TRANSPORTATION KEPT YOU FROM MEDICAL APPOINTMENTS OR FROM GETTING MEDICATIONS?: NO

## 2022-08-23 SDOH — HEALTH STABILITY: PHYSICAL HEALTH: ON AVERAGE, HOW MANY DAYS PER WEEK DO YOU ENGAGE IN MODERATE TO STRENUOUS EXERCISE (LIKE A BRISK WALK)?: 3 DAYS

## 2022-08-23 SDOH — HEALTH STABILITY: MENTAL HEALTH
STRESS IS WHEN SOMEONE FEELS TENSE, NERVOUS, ANXIOUS, OR CAN'T SLEEP AT NIGHT BECAUSE THEIR MIND IS TROUBLED. HOW STRESSED ARE YOU?: RATHER MUCH

## 2022-08-23 SDOH — ECONOMIC STABILITY: INCOME INSECURITY: HOW HARD IS IT FOR YOU TO PAY FOR THE VERY BASICS LIKE FOOD, HOUSING, MEDICAL CARE, AND HEATING?: NOT HARD AT ALL

## 2022-08-23 SDOH — ECONOMIC STABILITY: TRANSPORTATION INSECURITY
IN THE PAST 12 MONTHS, HAS LACK OF RELIABLE TRANSPORTATION KEPT YOU FROM MEDICAL APPOINTMENTS, MEETINGS, WORK OR FROM GETTING THINGS NEEDED FOR DAILY LIVING?: NO

## 2022-08-23 ASSESSMENT — SOCIAL DETERMINANTS OF HEALTH (SDOH)
HOW OFTEN DO YOU ATTEND CHURCH OR RELIGIOUS SERVICES?: NEVER
HOW OFTEN DO YOU GET TOGETHER WITH FRIENDS OR RELATIVES?: TWICE A WEEK
HOW OFTEN DO YOU ATTENT MEETINGS OF THE CLUB OR ORGANIZATION YOU BELONG TO?: NEVER
IN A TYPICAL WEEK, HOW MANY TIMES DO YOU TALK ON THE PHONE WITH FAMILY, FRIENDS, OR NEIGHBORS?: THREE TIMES A WEEK
DO YOU BELONG TO ANY CLUBS OR ORGANIZATIONS SUCH AS CHURCH GROUPS UNIONS, FRATERNAL OR ATHLETIC GROUPS, OR SCHOOL GROUPS?: NO
HOW OFTEN DO YOU HAVE SIX OR MORE DRINKS ON ONE OCCASION: LESS THAN MONTHLY
WITHIN THE PAST 12 MONTHS, YOU WORRIED THAT YOUR FOOD WOULD RUN OUT BEFORE YOU GOT THE MONEY TO BUY MORE: NEVER TRUE
HOW HARD IS IT FOR YOU TO PAY FOR THE VERY BASICS LIKE FOOD, HOUSING, MEDICAL CARE, AND HEATING?: NOT HARD AT ALL
HOW OFTEN DO YOU ATTEND CHURCH OR RELIGIOUS SERVICES?: NEVER
DO YOU BELONG TO ANY CLUBS OR ORGANIZATIONS SUCH AS CHURCH GROUPS UNIONS, FRATERNAL OR ATHLETIC GROUPS, OR SCHOOL GROUPS?: NO
HOW OFTEN DO YOU ATTENT MEETINGS OF THE CLUB OR ORGANIZATION YOU BELONG TO?: NEVER
HOW OFTEN DO YOU HAVE A DRINK CONTAINING ALCOHOL: 2-3 TIMES A WEEK
HOW OFTEN DO YOU GET TOGETHER WITH FRIENDS OR RELATIVES?: TWICE A WEEK
HOW MANY DRINKS CONTAINING ALCOHOL DO YOU HAVE ON A TYPICAL DAY WHEN YOU ARE DRINKING: 3 OR 4
IN A TYPICAL WEEK, HOW MANY TIMES DO YOU TALK ON THE PHONE WITH FAMILY, FRIENDS, OR NEIGHBORS?: THREE TIMES A WEEK

## 2022-08-23 ASSESSMENT — LIFESTYLE VARIABLES
HOW MANY STANDARD DRINKS CONTAINING ALCOHOL DO YOU HAVE ON A TYPICAL DAY: 3 OR 4
HOW OFTEN DO YOU HAVE SIX OR MORE DRINKS ON ONE OCCASION: LESS THAN MONTHLY
SKIP TO QUESTIONS 9-10: 0
AUDIT-C TOTAL SCORE: 5
HOW OFTEN DO YOU HAVE A DRINK CONTAINING ALCOHOL: 2-3 TIMES A WEEK

## 2022-08-23 ASSESSMENT — FIBROSIS 4 INDEX: FIB4 SCORE: 0.37

## 2022-09-12 NOTE — PROGRESS NOTES
"Subjective:     CC:   Chief Complaint   Patient presents with    Paperwork    Follow-Up     PE     HPI:   Gretel presents today with the following:    Paperwork completion  Patient here today for a physical and to have paperwork completed before moving. Up-to-date on tetanus booster.  Has exercising regularly and eating a balanced diet. No complaints at this time.     ROS:   Gen: no fevers/chills, no changes in weight  Eyes: no changes in vision  ENT: no sore throat, no hearing loss, no bloody nose  Pulm: no sob, no cough  CV: no chest pain, no palpitations  GI: no nausea/vomiting, no diarrhea  : no dysuria  MSk: no myalgias  Skin: no rash  Neuro: no headaches, no numbness/tingling  Heme/Lymph: no easy bruising        - NOTE: All other systems reviewed and are negative, except as in HPI.    Objective:     Exam: /68 (BP Location: Right arm, Patient Position: Sitting, BP Cuff Size: Adult)   Pulse 76   Temp 36.4 °C (97.5 °F)   Resp 14   Ht 1.753 m (5' 9\")   Wt 79.9 kg (176 lb 3.2 oz)   SpO2 96%  Body mass index is 26.02 kg/m².    Constitutional: Alert, no distress, well-groomed.  Skin: Warm, dry, good turgor, no rashes in visible areas.  Eye: Equal, round and reactive, conjunctiva clear, lids normal.  ENMT: Lips without lesions, good dentition, moist mucous membranes.  Neck: Trachea midline, no masses, no thyromegaly.  Respiratory: Unlabored respiratory effort, no cough. Clear to ausculation. No rales, ronchi, or wheezing.  Cardiovascular: Regular rate and rhythm without murmur. Carotid and radial pulses are intact and equal bilaterally.  MSK: Normal gait, moves all extremities.  Neuro: Grossly non-focal.   Psych: Alert and oriented x3, normal affect and mood.    Assessment & Plan:     22 y.o. female with the following -     1. Encounter for completion of form with patient  Paperwork completed with patient in office, original returned to patient.       "

## 2022-09-13 ENCOUNTER — OFFICE VISIT (OUTPATIENT)
Dept: MEDICAL GROUP | Facility: LAB | Age: 22
End: 2022-09-13
Payer: COMMERCIAL

## 2022-09-13 VITALS
HEART RATE: 74 BPM | WEIGHT: 180 LBS | TEMPERATURE: 98.2 F | BODY MASS INDEX: 26.66 KG/M2 | OXYGEN SATURATION: 97 % | HEIGHT: 69 IN | DIASTOLIC BLOOD PRESSURE: 70 MMHG | RESPIRATION RATE: 16 BRPM | SYSTOLIC BLOOD PRESSURE: 110 MMHG

## 2022-09-13 DIAGNOSIS — E66.3 OVERWEIGHT (BMI 25.0-29.9): ICD-10-CM

## 2022-09-13 PROCEDURE — 99213 OFFICE O/P EST LOW 20 MIN: CPT | Performed by: FAMILY MEDICINE

## 2022-09-13 RX ORDER — PHENTERMINE HYDROCHLORIDE 37.5 MG/1
37.5 CAPSULE ORAL EVERY MORNING
Qty: 30 CAPSULE | Refills: 0 | Status: SHIPPED | OUTPATIENT
Start: 2022-09-13 | End: 2022-10-13

## 2022-09-13 ASSESSMENT — FIBROSIS 4 INDEX: FIB4 SCORE: 0.37

## 2022-09-13 NOTE — PROGRESS NOTES
Chief Complaint:   Chief Complaint   Patient presents with    Weight Check     1 MO FV ms     Established patient  HPI:  Gretel Aguilar is a 22 y.o. fe/male who presents for follow-up on weight management       Overweight (BMI 25.0-29.9)  Patient gained around 4 pounds since last visit, she blames it on being stressed out because of a lot of social situation going on she is planning to move to [place], and also planning for her wedding.    Has not been using her phentermine.    Patient is keeping calories around 1200,, no eating high amount of proteins daily like recommended.  Eating around 40 to 50 g/day.    Exercising regularly and drinking large amount of water.      Past medical history, family history, social history and medications reviewed and updated in the record.   Current medications, problem list and allergies reviewed in [facility]  Health maintenance topics are reviewed and updated.    Patient Active Problem List    Diagnosis Date Noted    Overweight (BMI 25.0-29.9) 03/10/2022    Migraine 10/03/2018    Endometriosis determined by laparoscopy 02/14/2014     Family History   Problem Relation Age of Onset    Heart Disease Maternal Grandmother     Diabetes Paternal Grandmother     Cancer Paternal Grandfather         liver, skin     Social History     Socioeconomic History    Marital status:      Spouse name: Not on file    Number of children: Not on file    Years of education: Not on file    Highest education level: Bachelor's degree (e.g., BA, AB, BS)   Occupational History    Not on file   Tobacco Use    Smoking status: Never    Smokeless tobacco: Never   Vaping Use    Vaping Use: Never used   Substance and Sexual Activity    Alcohol use: Yes     Alcohol/week: 1.2 oz     Types: 2 Standard drinks or equivalent per week     Comment: 2-3 a week     Drug use: Never    Sexual activity: Yes     Partners: Male     Birth control/protection: I.U.D.   Other Topics Concern    Not on file   Social History  Narrative    Not on file     Social Determinants of Health     Financial Resource Strain: Low Risk     Difficulty of Paying Living Expenses: Not hard at all   Food Insecurity: No Food Insecurity    Worried About Running Out of Food in the Last Year: Never true    Ran Out of Food in the Last Year: Never true   Transportation Needs: No Transportation Needs    Lack of Transportation (Medical): No    Lack of Transportation (Non-Medical): No   Physical Activity: Sufficiently Active    Days of Exercise per Week: 3 days    Minutes of Exercise per Session: 60 min   Stress: Stress Concern Present    Feeling of Stress : Rather much   Social Connections: Moderately Isolated    Frequency of Communication with Friends and Family: Three times a week    Frequency of Social Gatherings with Friends and Family: Twice a week    Attends [employer] Services: Never    Active Member of Clubs or Organizations: No    Attends Club or Organization Meetings: Never    Marital Status:    Intimate Partner Violence: Not on file   Housing Stability: Low Risk     Unable to Pay for Housing in the Last Year: No    Number of Places Lived in the Last Year: 1    Unstable Housing in the Last Year: No     Current Outpatient Medications   Medication Sig Dispense Refill    phentermine 37.5 MG capsule Take 1 Capsule by mouth every morning for 30 days. 30 Capsule 0    ibuprofen (MOTRIN) 800 MG Tab Take 800 mg by mouth every 8 hours as needed.      rizatriptan (MAXALT) 10 MG tablet Take 1 Tablet by mouth one time as needed for Migraine for up to 1 dose. 10 Tablet 11    ondansetron (ZOFRAN) 4 MG Tab tablet Take 1 Tablet by mouth every 6 hours as needed for Nausea/Vomiting. 20 Each 0    Levonorgestrel (LILETTA, 52 MG, IU) by Intrauterine route.       No current facility-administered medications for this visit.           Review Of Systems  As documented in HPI above  PHYSICAL EXAMINATION:    /70 (BP Location: Right arm, Patient Position: Sitting, BP  "Cuff Size: Adult)   Pulse 74   Temp 36.8 °C (98.2 °F) (Temporal)   Resp 16   Ht 1.753 m (5' 9\")   Wt 81.6 kg (180 lb)   SpO2 97%   BMI 26.58 kg/m²   Gen.: Well-developed, well-nourished, no apparent distress, pleasant and cooperative with the examination  HEENT: Normocephalic/atraumatic,     Neck: No JVD or bruits, no adenopathy  Cor: Regular rate and rhythm without murmur gallop or rub  Lungs: Clear to auscultation with equal breath sounds bilaterally. No wheezes, rhonchi.  Abdomen: Soft nontender without hepatosplenomegaly or masses appreciated, normoactive bowel sounds  Extremities: No cyanosis, clubbing or edema          ASSESSMENT/Plan:  1. Overweight (BMI 25.0-29.9)  Chronic, uncontrolled     patient has been gaining few pounds since last visit.  Advised to restart taking phentermine,termine 37.5 MG capsule    Increase protein intake in diet for minimum of 70 g/day, keep calories around 1200, continue with exercise and high water intake, follow-up in 2 weeks.         Please note that this dictation was created using voice recognition software. I have made every reasonable attempt to correct obvious errors but there may be errors of grammar and content that I may have overlooked prior to finalization of this note.     "

## 2022-09-16 DIAGNOSIS — F51.01 PRIMARY INSOMNIA: ICD-10-CM

## 2022-09-16 RX ORDER — HYDROXYZINE HYDROCHLORIDE 25 MG/1
25 TABLET, FILM COATED ORAL NIGHTLY PRN
Qty: 30 TABLET | Refills: 6 | Status: SHIPPED | OUTPATIENT
Start: 2022-09-16 | End: 2023-01-27

## 2022-09-29 ENCOUNTER — APPOINTMENT (OUTPATIENT)
Dept: MEDICAL GROUP | Facility: LAB | Age: 22
End: 2022-09-29
Payer: COMMERCIAL

## 2022-10-10 NOTE — ASSESSMENT & PLAN NOTE
This is a chronic condition. Patient has been taking phentermine, would like a refill at this time. Currently also managing with lifestyle modifications including diet and exercise.

## 2022-10-14 ENCOUNTER — OFFICE VISIT (OUTPATIENT)
Dept: MEDICAL GROUP | Facility: LAB | Age: 22
End: 2022-10-14
Payer: COMMERCIAL

## 2022-10-14 VITALS
HEART RATE: 74 BPM | DIASTOLIC BLOOD PRESSURE: 70 MMHG | SYSTOLIC BLOOD PRESSURE: 106 MMHG | HEIGHT: 69 IN | WEIGHT: 175 LBS | RESPIRATION RATE: 16 BRPM | TEMPERATURE: 98.2 F | OXYGEN SATURATION: 97 % | BODY MASS INDEX: 25.92 KG/M2

## 2022-10-14 DIAGNOSIS — E66.3 OVERWEIGHT (BMI 25.0-29.9): ICD-10-CM

## 2022-10-14 PROCEDURE — 99213 OFFICE O/P EST LOW 20 MIN: CPT | Performed by: FAMILY MEDICINE

## 2022-10-14 RX ORDER — PHENTERMINE HYDROCHLORIDE 37.5 MG/1
37.5 CAPSULE ORAL EVERY MORNING
Qty: 30 CAPSULE | Refills: 0 | Status: SHIPPED | OUTPATIENT
Start: 2022-10-14 | End: 2022-11-13

## 2022-10-14 ASSESSMENT — FIBROSIS 4 INDEX: FIB4 SCORE: 0.37

## 2022-10-14 NOTE — PROGRESS NOTES
Chief Complaint:   Chief Complaint   Patient presents with    Weight Check       HPI: Established patient  Gretel Aguilar is a 22 y.o. female who presents for follow-up    1. Overweight   Needs refill for her prescription, patient is 7 pounds away from her normal BMI and goal.  Doing well lost around 4 to 5 pounds since last visit.  Denies side effects from medication.  Patient on IUD for contraception.      Past medical history, family history, social history and medications reviewed and updated in the record.  Today  Current medications, problem list and allergies reviewed in [facility] today  Health maintenance topics are reviewed and updated.    Patient Active Problem List    Diagnosis Date Noted    Overweight (BMI 25.0-29.9) 03/10/2022    Migraine 10/03/2018    Endometriosis determined by laparoscopy 02/14/2014     Family History   Problem Relation Age of Onset    Heart Disease Maternal Grandmother     Diabetes Paternal Grandmother     Cancer Paternal Grandfather         liver, skin     Social History     Socioeconomic History    Marital status:      Spouse name: Not on file    Number of children: Not on file    Years of education: Not on file    Highest education level: Bachelor's degree (e.g., BA, AB, BS)   Occupational History    Not on file   Tobacco Use    Smoking status: Never    Smokeless tobacco: Never   Vaping Use    Vaping Use: Never used   Substance and Sexual Activity    Alcohol use: Yes     Alcohol/week: 1.2 oz     Types: 2 Standard drinks or equivalent per week     Comment: 2-3 a week     Drug use: Never    Sexual activity: Yes     Partners: Male     Birth control/protection: I.U.D.   Other Topics Concern    Not on file   Social History Narrative    Not on file     Social Determinants of Health     Financial Resource Strain: Low Risk     Difficulty of Paying Living Expenses: Not hard at all   Food Insecurity: No Food Insecurity    Worried About Running Out of Food in the Last Year: Never  "true    Ran Out of Food in the Last Year: Never true   Transportation Needs: No Transportation Needs    Lack of Transportation (Medical): No    Lack of Transportation (Non-Medical): No   Physical Activity: Sufficiently Active    Days of Exercise per Week: 3 days    Minutes of Exercise per Session: 60 min   Stress: Stress Concern Present    Feeling of Stress : Rather much   Social Connections: Moderately Isolated    Frequency of Communication with Friends and Family: Three times a week    Frequency of Social Gatherings with Friends and Family: Twice a week    Attends Yarsani Services: Never    Active Member of Clubs or Organizations: No    Attends Club or Organization Meetings: Never    Marital Status:    Intimate Partner Violence: Not on file   Housing Stability: Low Risk     Unable to Pay for Housing in the Last Year: No    Number of Places Lived in the Last Year: 1    Unstable Housing in the Last Year: No       Current Outpatient Medications   Medication Sig Dispense Refill    phentermine 37.5 MG capsule Take 1 Capsule by mouth every morning for 30 days. 30 Capsule 0    hydrOXYzine HCl (ATARAX) 25 MG Tab Take 1 Tablet by mouth at bedtime as needed for Anxiety. 30 Tablet 6    ibuprofen (MOTRIN) 800 MG Tab Take 800 mg by mouth every 8 hours as needed.      rizatriptan (MAXALT) 10 MG tablet Take 1 Tablet by mouth one time as needed for Migraine for up to 1 dose. 10 Tablet 11    ondansetron (ZOFRAN) 4 MG Tab tablet Take 1 Tablet by mouth every 6 hours as needed for Nausea/Vomiting. 20 Each 0    Levonorgestrel (LILETTA, 52 MG, IU) by Intrauterine route.       No current facility-administered medications for this visit.         Review Of Systems  As documented in HPI above  PHYSICAL EXAMINATION:    /70 (BP Location: Right arm, Patient Position: Sitting, BP Cuff Size: Adult)   Pulse 74   Temp 36.8 °C (98.2 °F) (Temporal)   Resp 16   Ht 1.753 m (5' 9\")   Wt 79.4 kg (175 lb)   SpO2 97%   BMI 25.84 " kg/m²   Gen.: Well-developed, well-nourished, no apparent distress, pleasant and cooperative with the examination  HEENT: Normocephalic/atraumatic,   Neck: No JVD or bruits, no adenopathy  Cor: Regular rate and rhythm without murmur gallop or rub  Lungs: Clear to auscultation with equal breath sounds bilaterally. No wheezes, rhonchi.  Abdomen: Soft nontender without hepatosplenomegaly or masses appreciated, normoactive bowel sounds  Extremities: No cyanosis, clubbing or edema        ASSESSMENT/Plan:  1. Overweight (BMI 25.0-29.9)  Well-controlled 5 pounds away from her normal BMI and goal.  Encouraged to continue lifestyle changes exercise high-protein diet, refilled phentermine today, continue follow-up as directed  phentermine 37.5 MG capsule        Please note that this dictation was created using voice recognition software. I have made every reasonable attempt to correct obvious errors but there may be errors of grammar and content that I may have overlooked prior to finalization of this note.

## 2022-10-25 ENCOUNTER — OFFICE VISIT (OUTPATIENT)
Dept: MEDICAL GROUP | Facility: LAB | Age: 22
End: 2022-10-25
Payer: COMMERCIAL

## 2022-10-25 VITALS
TEMPERATURE: 97.9 F | DIASTOLIC BLOOD PRESSURE: 72 MMHG | WEIGHT: 180 LBS | RESPIRATION RATE: 16 BRPM | SYSTOLIC BLOOD PRESSURE: 110 MMHG | HEIGHT: 69 IN | BODY MASS INDEX: 26.66 KG/M2 | OXYGEN SATURATION: 92 % | HEART RATE: 82 BPM

## 2022-10-25 DIAGNOSIS — J06.9 UPPER RESPIRATORY TRACT INFECTION, UNSPECIFIED TYPE: ICD-10-CM

## 2022-10-25 LAB
EXTERNAL QUALITY CONTROL: NORMAL
FLUAV+FLUBV AG SPEC QL IA: NORMAL
INT CON NEG: NORMAL
INT CON NEG: NORMAL
INT CON POS: NORMAL
INT CON POS: NORMAL
SARS-COV+SARS-COV-2 AG RESP QL IA.RAPID: NEGATIVE

## 2022-10-25 PROCEDURE — 87804 INFLUENZA ASSAY W/OPTIC: CPT | Performed by: NURSE PRACTITIONER

## 2022-10-25 PROCEDURE — 87426 SARSCOV CORONAVIRUS AG IA: CPT | Performed by: NURSE PRACTITIONER

## 2022-10-25 PROCEDURE — 99213 OFFICE O/P EST LOW 20 MIN: CPT | Performed by: NURSE PRACTITIONER

## 2022-10-25 ASSESSMENT — FIBROSIS 4 INDEX: FIB4 SCORE: 0.37

## 2022-10-25 NOTE — PROGRESS NOTES
"Subjective:     CC:   Chief Complaint   Patient presents with    URI     HPI:   Gretel presents today with the following:    URI  Onset 2 days ago. Current symptoms include cough, runny nose, fever (tmax 100.4), chills, fatigue, sore throat, diarrhea.   Denies myalgias, shortness of breath, wheezing, N/V, ear pain/fullness, sinus pain/pressure.   She had her wedding ceremony on Saturday and might have been exposed to pathogens.   Has tried taking OTC cold medication with some relief.     ROS:   As documented in history of present illness above    Objective:     Exam: /72   Pulse 82   Temp 36.6 °C (97.9 °F)   Resp 16   Ht 1.753 m (5' 9\")   Wt 81.6 kg (180 lb)   SpO2 92%  Body mass index is 26.58 kg/m².    Constitutional: Alert, no distress, plus 3 vital signs  Skin:  Warm, dry, no rashes invisible areas  Eye: Equal, round and reactive, conjunctiva clear  ENMT: Bilateral tympanic membranes are retracted but translucent without erythema or perforation. Positive bilateral maxillary sinus tenderness. Oropharynx is slightly injected without exudate. No tonsillar enlargement.    Neck: Mild anterior cervical, nontender lymphadenopathy  Respiratory: Unlabored respiration, lungs clear to auscultation, no wheezes, no rhonchi  Cardiovascular: Normal rate and rhythm  Psych: Alert, pleasant, well-groomed, normal affect    Assessment & Plan:     22 y.o. female with the following -     1. Upper respiratory tract infection, unspecified type  Discussed using a cool mist humidifier at home. Recommend using nasal saline wash (i.e. Nedi-Pot), over-the-counter decongestants as needed. Tylenol or Motrin as needed for headache or discomfort. Supportive care, differential diagnoses, and indications for immediate follow-up discussed with patient. Pathogenesis of diagnosis discussed including typical length and natural progression. Instructed to return to clinic for any change in condition, further concerns, or worsening of " symptoms.  - POCT SARS-COV Antigen MARV (Symptomatic only)  - POCT Influenza A/B

## 2022-10-25 NOTE — LETTER
October 25, 2022         Patient: Gretel Aguilar   YOB: 2000   Date of Visit: 10/25/2022           To Whom it May Concern:    Gretel Aguilar was seen in my clinic on 10/25/2022. She may return to work on 10/27/2022.    If you have any questions or concerns, please don't hesitate to call.        Sincerely,           ELBA Fraser.

## 2022-11-14 ENCOUNTER — PRE-ADMISSION TESTING (OUTPATIENT)
Dept: ADMISSIONS | Facility: MEDICAL CENTER | Age: 22
End: 2022-11-14
Attending: OBSTETRICS & GYNECOLOGY
Payer: COMMERCIAL

## 2022-11-14 DIAGNOSIS — Z01.812 PRE-OPERATIVE LABORATORY EXAMINATION: ICD-10-CM

## 2022-11-14 LAB
ANION GAP SERPL CALC-SCNC: 10 MMOL/L (ref 7–16)
BASOPHILS # BLD AUTO: 0.5 % (ref 0–1.8)
BASOPHILS # BLD: 0.02 K/UL (ref 0–0.12)
BUN SERPL-MCNC: 5 MG/DL (ref 8–22)
CALCIUM SERPL-MCNC: 9.4 MG/DL (ref 8.5–10.5)
CHLORIDE SERPL-SCNC: 106 MMOL/L (ref 96–112)
CO2 SERPL-SCNC: 22 MMOL/L (ref 20–33)
CREAT SERPL-MCNC: 0.73 MG/DL (ref 0.5–1.4)
EOSINOPHIL # BLD AUTO: 0.03 K/UL (ref 0–0.51)
EOSINOPHIL NFR BLD: 0.8 % (ref 0–6.9)
ERYTHROCYTE [DISTWIDTH] IN BLOOD BY AUTOMATED COUNT: 45.4 FL (ref 35.9–50)
GFR SERPLBLD CREATININE-BSD FMLA CKD-EPI: 118 ML/MIN/1.73 M 2
GLUCOSE SERPL-MCNC: 90 MG/DL (ref 65–99)
HCG SERPL QL: NEGATIVE
HCT VFR BLD AUTO: 49.4 % (ref 37–47)
HGB BLD-MCNC: 16.6 G/DL (ref 12–16)
IMM GRANULOCYTES # BLD AUTO: 0.02 K/UL (ref 0–0.11)
IMM GRANULOCYTES NFR BLD AUTO: 0.5 % (ref 0–0.9)
LYMPHOCYTES # BLD AUTO: 1.08 K/UL (ref 1–4.8)
LYMPHOCYTES NFR BLD: 27.3 % (ref 22–41)
MCH RBC QN AUTO: 31.2 PG (ref 27–33)
MCHC RBC AUTO-ENTMCNC: 33.6 G/DL (ref 33.6–35)
MCV RBC AUTO: 92.9 FL (ref 81.4–97.8)
MONOCYTES # BLD AUTO: 0.67 K/UL (ref 0–0.85)
MONOCYTES NFR BLD AUTO: 17 % (ref 0–13.4)
NEUTROPHILS # BLD AUTO: 2.13 K/UL (ref 2–7.15)
NEUTROPHILS NFR BLD: 53.9 % (ref 44–72)
NRBC # BLD AUTO: 0 K/UL
NRBC BLD-RTO: 0 /100 WBC
PLATELET # BLD AUTO: 328 K/UL (ref 164–446)
PMV BLD AUTO: 8.8 FL (ref 9–12.9)
POTASSIUM SERPL-SCNC: 4.2 MMOL/L (ref 3.6–5.5)
RBC # BLD AUTO: 5.32 M/UL (ref 4.2–5.4)
SODIUM SERPL-SCNC: 138 MMOL/L (ref 135–145)
WBC # BLD AUTO: 4 K/UL (ref 4.8–10.8)

## 2022-11-14 PROCEDURE — 36415 COLL VENOUS BLD VENIPUNCTURE: CPT

## 2022-11-14 PROCEDURE — 80048 BASIC METABOLIC PNL TOTAL CA: CPT

## 2022-11-14 PROCEDURE — 84703 CHORIONIC GONADOTROPIN ASSAY: CPT

## 2022-11-14 PROCEDURE — 85025 COMPLETE CBC W/AUTO DIFF WBC: CPT

## 2022-11-14 RX ORDER — PHENTERMINE HYDROCHLORIDE 37.5 MG/1
37.5 CAPSULE ORAL EVERY MORNING
COMMUNITY
End: 2023-06-30

## 2022-11-14 ASSESSMENT — FIBROSIS 4 INDEX: FIB4 SCORE: 0.37

## 2022-11-15 ENCOUNTER — HOSPITAL ENCOUNTER (OUTPATIENT)
Facility: MEDICAL CENTER | Age: 22
End: 2022-11-15
Attending: OBSTETRICS & GYNECOLOGY | Admitting: OBSTETRICS & GYNECOLOGY
Payer: COMMERCIAL

## 2022-11-15 ENCOUNTER — ANESTHESIA (OUTPATIENT)
Dept: SURGERY | Facility: MEDICAL CENTER | Age: 22
End: 2022-11-15
Payer: COMMERCIAL

## 2022-11-15 ENCOUNTER — ANESTHESIA EVENT (OUTPATIENT)
Dept: SURGERY | Facility: MEDICAL CENTER | Age: 22
End: 2022-11-15
Payer: COMMERCIAL

## 2022-11-15 VITALS
SYSTOLIC BLOOD PRESSURE: 100 MMHG | TEMPERATURE: 97.1 F | WEIGHT: 175.27 LBS | OXYGEN SATURATION: 94 % | HEIGHT: 69 IN | BODY MASS INDEX: 25.96 KG/M2 | DIASTOLIC BLOOD PRESSURE: 63 MMHG | HEART RATE: 73 BPM | RESPIRATION RATE: 18 BRPM

## 2022-11-15 LAB — PATHOLOGY CONSULT NOTE: NORMAL

## 2022-11-15 PROCEDURE — 88305 TISSUE EXAM BY PATHOLOGIST: CPT

## 2022-11-15 PROCEDURE — 700102 HCHG RX REV CODE 250 W/ 637 OVERRIDE(OP): Performed by: ANESTHESIOLOGY

## 2022-11-15 PROCEDURE — 160009 HCHG ANES TIME/MIN: Performed by: OBSTETRICS & GYNECOLOGY

## 2022-11-15 PROCEDURE — 160002 HCHG RECOVERY MINUTES (STAT): Performed by: OBSTETRICS & GYNECOLOGY

## 2022-11-15 PROCEDURE — 160036 HCHG PACU - EA ADDL 30 MINS PHASE I: Performed by: OBSTETRICS & GYNECOLOGY

## 2022-11-15 PROCEDURE — 700111 HCHG RX REV CODE 636 W/ 250 OVERRIDE (IP): Performed by: ANESTHESIOLOGY

## 2022-11-15 PROCEDURE — 700101 HCHG RX REV CODE 250: Performed by: ANESTHESIOLOGY

## 2022-11-15 PROCEDURE — 88304 TISSUE EXAM BY PATHOLOGIST: CPT

## 2022-11-15 PROCEDURE — 160039 HCHG SURGERY MINUTES - EA ADDL 1 MIN LEVEL 3: Performed by: OBSTETRICS & GYNECOLOGY

## 2022-11-15 PROCEDURE — 00840 ANES IPER PX LOWER ABD NOS: CPT | Performed by: ANESTHESIOLOGY

## 2022-11-15 PROCEDURE — 160048 HCHG OR STATISTICAL LEVEL 1-5: Performed by: OBSTETRICS & GYNECOLOGY

## 2022-11-15 PROCEDURE — 160035 HCHG PACU - 1ST 60 MINS PHASE I: Performed by: OBSTETRICS & GYNECOLOGY

## 2022-11-15 PROCEDURE — 160046 HCHG PACU - 1ST 60 MINS PHASE II: Performed by: OBSTETRICS & GYNECOLOGY

## 2022-11-15 PROCEDURE — 700101 HCHG RX REV CODE 250: Performed by: OBSTETRICS & GYNECOLOGY

## 2022-11-15 PROCEDURE — 160028 HCHG SURGERY MINUTES - 1ST 30 MINS LEVEL 3: Performed by: OBSTETRICS & GYNECOLOGY

## 2022-11-15 PROCEDURE — A9270 NON-COVERED ITEM OR SERVICE: HCPCS | Performed by: ANESTHESIOLOGY

## 2022-11-15 PROCEDURE — 160025 RECOVERY II MINUTES (STATS): Performed by: OBSTETRICS & GYNECOLOGY

## 2022-11-15 PROCEDURE — 700105 HCHG RX REV CODE 258: Performed by: OBSTETRICS & GYNECOLOGY

## 2022-11-15 RX ORDER — ONDANSETRON 2 MG/ML
INJECTION INTRAMUSCULAR; INTRAVENOUS PRN
Status: DISCONTINUED | OUTPATIENT
Start: 2022-11-15 | End: 2022-11-15 | Stop reason: SURG

## 2022-11-15 RX ORDER — HYDROMORPHONE HYDROCHLORIDE 1 MG/ML
0.2 INJECTION, SOLUTION INTRAMUSCULAR; INTRAVENOUS; SUBCUTANEOUS
Status: DISCONTINUED | OUTPATIENT
Start: 2022-11-15 | End: 2022-11-15 | Stop reason: HOSPADM

## 2022-11-15 RX ORDER — KETOROLAC TROMETHAMINE 30 MG/ML
INJECTION, SOLUTION INTRAMUSCULAR; INTRAVENOUS PRN
Status: DISCONTINUED | OUTPATIENT
Start: 2022-11-15 | End: 2022-11-15 | Stop reason: SURG

## 2022-11-15 RX ORDER — BUPIVACAINE HYDROCHLORIDE AND EPINEPHRINE 2.5; 5 MG/ML; UG/ML
INJECTION, SOLUTION EPIDURAL; INFILTRATION; INTRACAUDAL; PERINEURAL
Status: DISCONTINUED | OUTPATIENT
Start: 2022-11-15 | End: 2022-11-15 | Stop reason: HOSPADM

## 2022-11-15 RX ORDER — OXYCODONE HCL 5 MG/5 ML
10 SOLUTION, ORAL ORAL
Status: COMPLETED | OUTPATIENT
Start: 2022-11-15 | End: 2022-11-15

## 2022-11-15 RX ORDER — CEFAZOLIN SODIUM 1 G/3ML
INJECTION, POWDER, FOR SOLUTION INTRAMUSCULAR; INTRAVENOUS PRN
Status: DISCONTINUED | OUTPATIENT
Start: 2022-11-15 | End: 2022-11-15 | Stop reason: SURG

## 2022-11-15 RX ORDER — SODIUM CHLORIDE, SODIUM LACTATE, POTASSIUM CHLORIDE, CALCIUM CHLORIDE 600; 310; 30; 20 MG/100ML; MG/100ML; MG/100ML; MG/100ML
INJECTION, SOLUTION INTRAVENOUS CONTINUOUS
Status: DISCONTINUED | OUTPATIENT
Start: 2022-11-15 | End: 2022-11-15 | Stop reason: HOSPADM

## 2022-11-15 RX ORDER — DIPHENHYDRAMINE HYDROCHLORIDE 50 MG/ML
12.5 INJECTION INTRAMUSCULAR; INTRAVENOUS
Status: DISCONTINUED | OUTPATIENT
Start: 2022-11-15 | End: 2022-11-15 | Stop reason: HOSPADM

## 2022-11-15 RX ORDER — LIDOCAINE HYDROCHLORIDE 20 MG/ML
INJECTION, SOLUTION EPIDURAL; INFILTRATION; INTRACAUDAL; PERINEURAL PRN
Status: DISCONTINUED | OUTPATIENT
Start: 2022-11-15 | End: 2022-11-15 | Stop reason: SURG

## 2022-11-15 RX ORDER — HYDROMORPHONE HYDROCHLORIDE 1 MG/ML
0.5 INJECTION, SOLUTION INTRAMUSCULAR; INTRAVENOUS; SUBCUTANEOUS
Status: DISCONTINUED | OUTPATIENT
Start: 2022-11-15 | End: 2022-11-15 | Stop reason: HOSPADM

## 2022-11-15 RX ORDER — BUPIVACAINE HYDROCHLORIDE 2.5 MG/ML
INJECTION, SOLUTION EPIDURAL; INFILTRATION; INTRACAUDAL
Status: DISCONTINUED
Start: 2022-11-15 | End: 2022-11-15 | Stop reason: HOSPADM

## 2022-11-15 RX ORDER — EPINEPHRINE 1 MG/ML(1)
AMPUL (ML) INJECTION
Status: DISCONTINUED
Start: 2022-11-15 | End: 2022-11-15 | Stop reason: HOSPADM

## 2022-11-15 RX ORDER — OXYCODONE HCL 5 MG/5 ML
5 SOLUTION, ORAL ORAL
Status: COMPLETED | OUTPATIENT
Start: 2022-11-15 | End: 2022-11-15

## 2022-11-15 RX ORDER — DEXAMETHASONE SODIUM PHOSPHATE 4 MG/ML
INJECTION, SOLUTION INTRA-ARTICULAR; INTRALESIONAL; INTRAMUSCULAR; INTRAVENOUS; SOFT TISSUE PRN
Status: DISCONTINUED | OUTPATIENT
Start: 2022-11-15 | End: 2022-11-15 | Stop reason: SURG

## 2022-11-15 RX ORDER — ONDANSETRON 2 MG/ML
4 INJECTION INTRAMUSCULAR; INTRAVENOUS ONCE
Status: DISCONTINUED | OUTPATIENT
Start: 2022-11-15 | End: 2022-11-15 | Stop reason: HOSPADM

## 2022-11-15 RX ORDER — IPRATROPIUM BROMIDE AND ALBUTEROL SULFATE 2.5; .5 MG/3ML; MG/3ML
3 SOLUTION RESPIRATORY (INHALATION)
Status: DISCONTINUED | OUTPATIENT
Start: 2022-11-15 | End: 2022-11-15 | Stop reason: HOSPADM

## 2022-11-15 RX ORDER — MIDAZOLAM HYDROCHLORIDE 1 MG/ML
1 INJECTION INTRAMUSCULAR; INTRAVENOUS
Status: DISCONTINUED | OUTPATIENT
Start: 2022-11-15 | End: 2022-11-15 | Stop reason: HOSPADM

## 2022-11-15 RX ORDER — MEPERIDINE HYDROCHLORIDE 25 MG/ML
25 INJECTION INTRAMUSCULAR; INTRAVENOUS; SUBCUTANEOUS
Status: DISCONTINUED | OUTPATIENT
Start: 2022-11-15 | End: 2022-11-15 | Stop reason: HOSPADM

## 2022-11-15 RX ORDER — HYDROMORPHONE HYDROCHLORIDE 1 MG/ML
0.1 INJECTION, SOLUTION INTRAMUSCULAR; INTRAVENOUS; SUBCUTANEOUS
Status: DISCONTINUED | OUTPATIENT
Start: 2022-11-15 | End: 2022-11-15 | Stop reason: HOSPADM

## 2022-11-15 RX ADMIN — CEFAZOLIN 2 G: 330 INJECTION, POWDER, FOR SOLUTION INTRAMUSCULAR; INTRAVENOUS at 12:34

## 2022-11-15 RX ADMIN — MIDAZOLAM 2 MG: 1 INJECTION INTRAMUSCULAR; INTRAVENOUS at 12:34

## 2022-11-15 RX ADMIN — ROCURONIUM BROMIDE 50 MG: 10 INJECTION, SOLUTION INTRAVENOUS at 12:39

## 2022-11-15 RX ADMIN — FENTANYL CITRATE 25 MCG: 50 INJECTION, SOLUTION INTRAMUSCULAR; INTRAVENOUS at 14:30

## 2022-11-15 RX ADMIN — SUGAMMADEX 160 MG: 100 INJECTION, SOLUTION INTRAVENOUS at 13:45

## 2022-11-15 RX ADMIN — FENTANYL CITRATE 50 MCG: 50 INJECTION, SOLUTION INTRAMUSCULAR; INTRAVENOUS at 14:43

## 2022-11-15 RX ADMIN — PROPOFOL 150 MG: 10 INJECTION, EMULSION INTRAVENOUS at 12:39

## 2022-11-15 RX ADMIN — KETOROLAC TROMETHAMINE 30 MG: 30 INJECTION, SOLUTION INTRAMUSCULAR at 13:06

## 2022-11-15 RX ADMIN — FENTANYL CITRATE 25 MCG: 50 INJECTION, SOLUTION INTRAMUSCULAR; INTRAVENOUS at 14:18

## 2022-11-15 RX ADMIN — FENTANYL CITRATE 50 MCG: 50 INJECTION, SOLUTION INTRAMUSCULAR; INTRAVENOUS at 12:39

## 2022-11-15 RX ADMIN — FENTANYL CITRATE 50 MCG: 50 INJECTION, SOLUTION INTRAMUSCULAR; INTRAVENOUS at 13:05

## 2022-11-15 RX ADMIN — ONDANSETRON 4 MG: 2 INJECTION INTRAMUSCULAR; INTRAVENOUS at 12:55

## 2022-11-15 RX ADMIN — SODIUM CHLORIDE, POTASSIUM CHLORIDE, SODIUM LACTATE AND CALCIUM CHLORIDE: 600; 310; 30; 20 INJECTION, SOLUTION INTRAVENOUS at 12:34

## 2022-11-15 RX ADMIN — DEXAMETHASONE SODIUM PHOSPHATE 8 MG: 4 INJECTION, SOLUTION INTRA-ARTICULAR; INTRALESIONAL; INTRAMUSCULAR; INTRAVENOUS; SOFT TISSUE at 12:55

## 2022-11-15 RX ADMIN — LIDOCAINE HYDROCHLORIDE 60 MG: 20 INJECTION, SOLUTION EPIDURAL; INFILTRATION; INTRACAUDAL at 12:39

## 2022-11-15 RX ADMIN — OXYCODONE HYDROCHLORIDE 10 MG: 5 SOLUTION ORAL at 14:18

## 2022-11-15 ASSESSMENT — FIBROSIS 4 INDEX: FIB4 SCORE: 0.35

## 2022-11-15 ASSESSMENT — PAIN DESCRIPTION - PAIN TYPE
TYPE: SURGICAL PAIN

## 2022-11-15 ASSESSMENT — PAIN SCALES - GENERAL: PAIN_LEVEL: 4

## 2022-11-15 NOTE — OR SURGEON
Immediate Post OP Note    PreOp Diagnosis:     1.  Dysmenorrhea, dyspareunia, history of endometriosis.  2.  Pelvic pain.  3.  Desires conservative surgical management.      PostOp Diagnosis:     As above.  Endometriosis identified.  Interstitial cystitis.      Procedure(s):  PELVIC EXAM UNDER ANESTHESIA, PELVISCOPY, LYSIS OF ADHESIONS, EXCISION FULGURATION ENDOMETRIOSIS IMPLANTS, CYSTOSCOPY WITH BLADDER DISTENSION AND ANY OTHER MEDICALLY NECESSARY PROCEDURES  PELVISCOPY  LYSIS, ADHESIONS  EXCISION OR FULGURATION, ENDOMETRIOSIS, LAPAROSCOPIC  CYSTOSCOPY    Surgeon(s):  Sulma Diaz M.D.    Anesthesiologist/Type of Anesthesia:  Anesthesiologist: Sal Osborn M.D./JAMIE and local    Surgical Staff:  Circulator: Lotus Bright R.N.  Scrub Person: Jennifer D'Amico    Specimens removed if any:   Endometriosis implants.    Estimated Blood Loss: <10 cc    Findings: EUA - uterus is AV mobile and 6 weeks size.  IUD strings are visible.  Hulka uterine manipulator used in an effort to not dislodge her IUD.    Laparoscopic findings: uterus with endometriosis implants diffusely on th posterior serosal surface.  Bilateral ovaries with endometriosis implants noted on the posterior cortical surface. Normal bilateral tubes with a left sided paratubal cyst seen.  Endometriosis implants noted on the bilateral uterosacral ligaments.  Endometriosis implants were both excised and fulgurated.  No obvious pelvic adhesive disease noted.    Cystoscopic findings: bilateral ureteral orifices noted and within normal limits.  Diffusely erythematous bladder mucosa visualized and injected vasculature consistent with interstitial cystitis.    Complications: none apparent.        11/15/2022 12:34 PM Sulma Diaz M.D.

## 2022-11-15 NOTE — ANESTHESIA PREPROCEDURE EVALUATION
Case: [id number] Date/Time: 11/15/22 1145    Procedures:       PELVIC EXAM UNDER ANESTHESIA, PELVISCOPY, LYSIS OF ADHESIONS, EXCISION FULGURATION ENDOMETRIOSIS IMPLANTS, CYSTOSCOPY WITH BLADDER DISTENSION AND ANY OTHER MEDICALLY NECESSARY PROCEDURES      PELVISCOPY      LYSIS, ADHESIONS      EXCISION OR FULGURATION, ENDOMETRIOSIS, LAPAROSCOPIC      CYSTOSCOPY    Pre-op diagnosis: DYSMENORRHEA, ENDOMETRIOSIS, DYSPAREUNIA    Location: [facility] ROOM 23 / SURGERY SAME DAY [facility]    Surgeons: Sulma Diaz M.D.          Relevant Problems   NEURO   (positive) Migraine      CARDIAC   (positive) Migraine       Physical Exam    Airway   Mallampati: II  TM distance: >3 FB  Neck ROM: full       Cardiovascular - normal exam  Rhythm: regular  Rate: normal  (-) murmur     Dental - normal exam           Pulmonary - normal exam  Breath sounds clear to auscultation     Abdominal    Neurological - normal exam                 Anesthesia Plan    ASA 1       Plan - general       Airway plan will be ETT          Induction: intravenous    Postoperative Plan: Postoperative administration of opioids is intended.    Pertinent diagnostic labs and testing reviewed    Informed Consent:    Anesthetic plan and risks discussed with patient.    Use of blood products discussed with: patient whom consented to blood products.

## 2022-11-15 NOTE — OP REPORT
DATE OF SERVICE:  11/15/2022     PREOPERATIVE DIAGNOSES:  1.  Dysmenorrhea, dyspareunia and history of endometriosis.  2.  Pelvic pain.  3.  Desires conservative surgical management.     POSTOPERATIVE DIAGNOSES:  1.  Dysmenorrhea, dyspareunia and history of endometriosis.  2.  Pelvic pain.  3.  Desires conservative surgical management.  4.  Extensive endometriosis confirmed.  5.  Interstitial cystitis.     PROCEDURES:  Pelvic examination under anesthesia, pelviscopy, excision and   fulguration of endometriosis implants, cystoscopy and bladder distention.     SURGEON:  Sulma Diaz MD     ANESTHESIOLOGIST:  Sal Osborn MD     ANESTHESIA:  General endotracheal tube anesthesia and local anesthetic.     SPECIMENS:  Endometriosis implants.     ESTIMATED BLOOD LOSS:  Less than 10 mL.     FINDINGS:  On examination under anesthesia, her uterus is anteverted, mobile   and 6-week size.  IUD strings are visualized.  Hulka uterine manipulator used   in an effort to not dislodge her pre-existing IUD.     LAPAROSCOPIC FINDINGS:  Uterus with endometriosis implants diffusely on the   posterior surface of the uterus.  Bilateral ovaries with endometriosis   implants noted on the posterior cortical surface.  Normal bilateral tubes with   a left-sided paratubal cyst identified and removed.  Endometriosis implants   noted along the bilateral uterosacral ligaments.  Endometriosis implants were   both excised and fulgurated.  No obvious pelvic adhesive disease was present.     CYSTOSCOPIC FINDINGS:  Bilateral ureteral orifices were noted and within   normal limits.  The bladder mucosa was diffusely erythematous.  The   vasculature of the bladder was injected and dilated consistent with   interstitial cystitis.     COMPLICATIONS:  None apparent.     INDICATIONS FOR THE PROCEDURE:  The patient is a 22-year-old nulligravid,   sexually active woman with an unsure last menstrual period. An Liletta IUD in   place for  contraception and cycle control who has a long history of   endometriosis, who has been having worsening pelvic pain.  The patient has a   Liletta IUD in place for contraception and cycle control.     DETAILS OF THE PROCEDURE:  The patient was taken to the operating room where   she underwent general endotracheal tube anesthesia.  She was then placed in   the dorsal lithotomy position in the [facility].  A pelvic examination   under anesthesia was performed and findings as noted above.     The patient was then prepped and draped in the dorsal lithotomy position.  A   Krishnan catheter was placed in her urinary bladder.  Medium Graves speculum was   inserted into the vagina.  The anterior lip of the cervix was grasped with an   Allis clamp and initially an attempt was made to dilate her cervix and placed   a JOHN manipulator; however, I did not want to dislodge her IUD and thus I   placed a Hulka tenaculum.  The IUD strings were still visible.     The speculum was removed.     Attention turned to the laparoscopic portion of the procedure.  A 10 mm   infraumbilical skin incision was made with a scalpel after the instillation of   0.25% Marcaine with epinephrine.  The incision was carried down to the level   of the fascia.  The fascia was grasped with Kocher clamps, elevated and   incised with Orellana scissors.  The incision was extended laterally with Orellana   scissors.  Either side of the fascial incision was sutured with 0 Vicryl as   stay sutures.  The S retractors were placed within all of these incisions. The   peritoneum was identified and the Pean clamp was used to grasp the   peritoneum.  The peritoneum was incised with Metzenbaum scissors and extended   with Metzenbaum scissors.  The S retractors were placed within all of these   incisions.  Intraabdominal entry was confirmed.     The Yecenia trocar was inserted under direct visualization.  The CO2 gas was   connected and the opening pressure was 8 mmHg.     The  laparoscope was inserted.  The patient's pelvis and abdomen were explored   and findings as documented above.     A second trocar site was identified 3 fingerbreadths above the pubic   symphysis.  A 5 mm skin incision was made with a scalpel after the   instillation of 0.25% Marcaine with epinephrine.  The 5 mm trocar was inserted   under direct visualization.     Using the uterine manipulator, the uterus was elevated and the posterior   cul-de-sac was visualized.  The bilateral tubes and ovaries were examined.    The bilateral ovarian fossa and anterior cul-de-sac were visualized.    Endometriosis implants on the posterior serosal surface of the uterus were   excised using the Prestige clamp and then fulgurated using the monopolar   scissors.  Additional areas of endometriosis were excised with the Prestige   clamp and then fulgurated with the monopolar scissors.  There were multiple   implants along the bilateral ovaries and these were 10-point fulgurated with   the monopolar EndoShears.     There was a small paratubal cyst on the left fallopian tube.  This was grasped   with the Prestige clamp, elevated and incised with the scissors and then   removed using the EndoShears.  This was sent to pathology.  There were no   obvious endometriosis implants on the anterior cul-de-sac.  No obvious pelvic   adhesive disease was visualized.     The trocars were removed under direct visualization.  The CO2 gas was released   from the patient's abdomen.  The fascia was reapproximated using the stay   sutures.  This 10 mm skin incision was reapproximated using 4-0 Monocryl in a   subcuticular fashion.  The 5 mm trocar site skin was reapproximated using 4-0   Monocryl in a subcuticular fashion.     The uterine manipulator was removed.  The Krishnan catheter was removed.  The   30-degree cystoscope was inserted and the patient's bladder was distended with   500 mL of normal saline and the findings were noted as above.     The  patient's bladder was then drained through the cystoscope and the   cystoscope was then removed.     The speculum was inserted.  The cervix was visualized and tenaculum site   bleeding was made hemostatic with silver nitrate.  The speculum was removed.     The patient was taken out of dorsal lithotomy position.  She was then   extubated and taken to the PACU in stable condition.  Sponge, lap, needle and   instrument counts were correct x2.     The JOHN manipulator was not used and thus the decision was made to use the   [place] uterine manipulator.  Given that, I removed the Allis clamp and placed a   tenaculum on the anterior lip of the cervix.  I removed the tenaculum.        ______________________________  MD JASVIR Mantilla/RADHA    DD:  11/15/2022 14:07  DT:  11/15/2022 14:53    Job#:  [id number]

## 2022-11-15 NOTE — OR NURSING
1358 Patient arrived to PACU from OR. Report received. Patient attached to monitoring. VSS. Patient oxygenating well on 4 L via mask. Sites  on abdomen assessed. CDI, no drainage noted. Tegaderm on upper incision was ripped. RN replaced Tegaderm.     1418 Patient stating she has 5/10 pain in abdominal incision site area. Patient medicated with 10 mg of oxy and 25 mcg of fentanyl.     1430 Subsequent dose of fentanyl given for ongoing pain.     1443 Subsequent dose of fentanyl given for ongoing pain.     1500 Patient meets phase two criteria. Tolerating PO liquids without complication.     1517 RN went over discharge instructions with patients , over the phone. All questions answered.     1520 IV removed    1529 Patient meets discharge criteria. VSS. Pt discharged via wheelchair by RN. Pt in possession of all personal belongings.

## 2022-11-15 NOTE — ANESTHESIA TIME REPORT
Anesthesia Start and Stop Event Times     Date Time Event    11/15/2022 1140 Ready for Procedure     1234 Anesthesia Start     1402 Anesthesia Stop        Responsible Staff  11/15/22    Name Role Begin End    Sal Osborn M.D. Anesth 1234 1402        Overtime Reason:  no overtime (within assigned shift)    Comments:

## 2022-11-15 NOTE — ANESTHESIA POSTPROCEDURE EVALUATION
Patient: Gretel Peñaloza    Procedure Summary     Date: 11/15/22 Room / Location: [facility] ROOM 23 / SURGERY SAME DAY [facility]    Anesthesia Start: 1234 Anesthesia Stop: 1402    Procedures:       PELVIC EXAM UNDER ANESTHESIA, PELVISCOPY, LYSIS OF ADHESIONS, EXCISION FULGURATION ENDOMETRIOSIS IMPLANTS, CYSTOSCOPY WITH BLADDER DISTENSION AND ANY OTHER MEDICALLY NECESSARY PROCEDURES (Abdomen)      EXCISION OR FULGURATION, ENDOMETRIOSIS, LAPAROSCOPIC (Abdomen) Diagnosis: (DYSMENORRHEA, ENDOMETRIOSIS, DYSPAREUNIA)    Surgeons: Sulma Diaz M.D. Responsible Provider: Sal Osborn M.D.    Anesthesia Type: general ASA Status: 1          Final Anesthesia Type: general  Last vitals  BP   Blood Pressure: 112/73    Temp   36.2 °C (97.1 °F)    Pulse   70   Resp   18    SpO2   93 %      Anesthesia Post Evaluation    Patient location during evaluation: PACU  Patient participation: complete - patient participated  Level of consciousness: awake and alert  Pain score: 4    Airway patency: patent  Anesthetic complications: no  Cardiovascular status: hemodynamically stable  Respiratory status: acceptable  Hydration status: euvolemic    PONV: none          No notable events documented.     Nurse Pain Score: 4 (NPRS)

## 2022-11-15 NOTE — ANESTHESIA PROCEDURE NOTES
Airway    Date/Time: 11/15/2022 12:40 PM  Performed by: Sal Osborn M.D.  Authorized by: Sal Osborn M.D.     Location:  OR  Urgency:  Elective  Indications for Airway Management:  Anesthesia      Spontaneous Ventilation: absent    Sedation Level:  Deep  Preoxygenated: Yes    Patient Position:  Sniffing  Final Airway Type:  Endotracheal airway  Final Endotracheal Airway:  ETT  Cuffed: Yes    Technique Used for Successful ETT Placement:  Direct laryngoscopy    Insertion Site:  Oral  Blade Type:  Pauline  Laryngoscope Blade/Videolaryngoscope Blade Size:  3  ETT Size (mm):  7.0  Measured from:  Teeth  ETT to Teeth (cm):  21  Placement Verified by: auscultation and capnometry    Cormack-Lehane Classification:  Grade I - full view of glottis  Number of Attempts at Approach:  1

## 2022-11-15 NOTE — OR NURSING
Results of CBC and BMP faxed to Dr. Duran's office for MD review. Confirmation report of fax received.

## 2022-11-15 NOTE — DISCHARGE INSTRUCTIONS
What to Expect Post Anesthesia    Rest and take it easy for the first 24 hours.  A responsible adult is recommended to remain with you during that time.  It is normal to feel sleepy.  We encourage you to not do anything that requires balance, judgment or coordination.    FOR 24 HOURS DO NOT:  Drive, operate machinery or run household appliances.  Drink beer or alcoholic beverages.  Make important decisions or sign legal documents.    To avoid nausea, slowly advance diet as tolerated, avoiding spicy or greasy foods for the first day.  Add more substantial food to your diet according to your provider's instructions.  Babies can be fed formula or breast milk as soon as they are hungry.  INCREASE FLUIDS AND FIBER TO AVOID CONSTIPATION.    MILD FLU-LIKE SYMPTOMS ARE NORMAL.  YOU MAY EXPERIENCE GENERALIZED MUSCLE ACHES, THROAT IRRITATION, HEADACHE AND/OR SOME NAUSEA.      Pelvic Laparoscopy Discharge instructions    ACTIVITIES:  DO NOT USE tampons, douche, or have sexual intercourse until cleared at your follow-up appointment. Pelvic rest for six weeks    After discharge from the hospital, rest today, then you may resume full routine activities. However, there should be no heavy lifting (greater than 20 pounds) for four weeks, avoid straining, and no strenuous activities until after your follow-up visit. Otherwise, routine activities of daily living are acceptable.    DRIVING:   You may drive whenever you are off pain medications and are able to perform the activities needed to drive, i.e. turning, bending, twisting, etc.    BATHING:   You may shower starting tomorrow pat incisions dry with a clean towel do not rub. No tub baths, hot tubs, or swimming until your follow up appointment.     WOUND CARE:  You will have one or more small incisions. If you have wound dressings, they may come off after 48 hours. If you have skin glue to the wound, this will fall off on its own, do not pick at it. If you have steri strips to the  wound, these will fall off on their own, do not pick at them, may trim the edges if needed.     You may experience discomfort in the shoulder area, mild breathing difficulty, aching in the upper back and bloating for 2 to 3 days after this procedure due to the air inflated into the abdomen during your surgery.     Expect some vaginal bleeding, however, if you pass clots or saturate a filipe pad more often than once an hour or are not comfortable with the amount of blood you notice please notify your provider.      BOWEL FUNCTION:  Constipation is common after surgery. The combination of pain medication and decreased activity level can cause constipation in otherwise normal patients. If you feel this is occurring, take a laxative (Milk of Magnesia, Ex-Lax, Senokot, etc.) until the problem has been resolved. It also helps to stay regular by including fiber in your diet (for example bran or fruits and vegetables) and drink plenty of liquids (water, juice, etc.).        If any questions arise, call your provider.  If your provider is not available, please feel free to call the [facility] at [phone].    MEDICATIONS: Resume taking daily medication.  Take prescribed pain medication with food.  If no medication is prescribed, you may take non-aspirin pain medication if needed.  PAIN MEDICATION CAN BE VERY CONSTIPATING.  Take a stool softener or laxative such as senokot, pericolace, or milk of magnesia if needed.    Oxycodone given at 2:15 PM.  Toradol (ibuprofen-like medication) given at 1:00 pm. Next dose of ibuprofen due at 7:00 pm if needed.

## 2023-01-20 ENCOUNTER — OFFICE VISIT (OUTPATIENT)
Dept: URGENT CARE | Facility: PHYSICIAN GROUP | Age: 23
End: 2023-01-20
Payer: OTHER GOVERNMENT

## 2023-01-20 ENCOUNTER — HOSPITAL ENCOUNTER (OUTPATIENT)
Dept: RADIOLOGY | Facility: MEDICAL CENTER | Age: 23
End: 2023-01-20
Attending: NURSE PRACTITIONER
Payer: OTHER GOVERNMENT

## 2023-01-20 VITALS
SYSTOLIC BLOOD PRESSURE: 118 MMHG | RESPIRATION RATE: 18 BRPM | HEART RATE: 62 BPM | HEIGHT: 69 IN | WEIGHT: 179 LBS | BODY MASS INDEX: 26.51 KG/M2 | DIASTOLIC BLOOD PRESSURE: 80 MMHG | TEMPERATURE: 97.5 F | OXYGEN SATURATION: 98 %

## 2023-01-20 DIAGNOSIS — S09.92XA NASAL TRAUMA, INITIAL ENCOUNTER: ICD-10-CM

## 2023-01-20 PROCEDURE — 70160 X-RAY EXAM OF NASAL BONES: CPT

## 2023-01-20 PROCEDURE — 99213 OFFICE O/P EST LOW 20 MIN: CPT | Performed by: NURSE PRACTITIONER

## 2023-01-20 ASSESSMENT — ENCOUNTER SYMPTOMS
EYES NEGATIVE: 1
FEVER: 0
RESPIRATORY NEGATIVE: 1
CARDIOVASCULAR NEGATIVE: 1
GASTROINTESTINAL NEGATIVE: 1
CONSTITUTIONAL NEGATIVE: 1

## 2023-01-20 ASSESSMENT — FIBROSIS 4 INDEX: FIB4 SCORE: 0.35

## 2023-01-20 NOTE — PROGRESS NOTES
"Subjective:   Gretel Peñaloza is a 22 y.o. female who presents for Facial Injury (Broken nose, bruised x 1 day )      Patient presents with a chief complaint of \"possible nasal fracture.\"  She states she was playing with her dog yesterday who weighs about 56 pounds, and she had him on his back and he whipped his head back impacting her nose.  Patient states she had immediate onset of pain.  Patient reports she has had multiple nasal fractures in the past, and this is what they feel like.  She denies any difficulty breathing, or compromise of her airway.  She denies any bleeding from her nose.  She has recently changed insurance, and is establishing with a new PCP on 1/27/23, and will need to reestablish with ENT based on her new insurance which is [employer].    Facial Injury  This is a new problem. The current episode started yesterday. The problem occurs constantly. The problem has been unchanged. Pertinent negatives include no fever. She has tried NSAIDs and acetaminophen for the symptoms. The treatment provided mild relief.     Review of Systems   Constitutional: Negative.  Negative for fever.   HENT: Negative.          Nose pain   Eyes: Negative.    Respiratory: Negative.     Cardiovascular: Negative.    Gastrointestinal: Negative.    Skin: Negative.      Medications, Allergies, and current problem list reviewed today in Epic.     Objective:     /80   Pulse 62   Temp 36.4 °C (97.5 °F) (Temporal)   Resp 18   Ht 1.753 m (5' 9\")   Wt 81.2 kg (179 lb)   SpO2 98%     Physical Exam  Vitals reviewed.   Constitutional:       Appearance: Normal appearance.   HENT:      Head: Normocephalic and atraumatic.      Nose: Signs of injury and nasal tenderness present.        Comments: Tenderness to palpation over the bridge of patients nose and to the left side.  Bruising is noted.  Mild swelling noted.  No erythema or ecchymosis noted.      Mouth/Throat:      Mouth: Mucous membranes are moist.      Pharynx: " Oropharynx is clear.   Eyes:      Extraocular Movements: Extraocular movements intact.      Conjunctiva/sclera: Conjunctivae normal.      Pupils: Pupils are equal, round, and reactive to light.   Cardiovascular:      Rate and Rhythm: Normal rate and regular rhythm.   Pulmonary:      Effort: Pulmonary effort is normal.      Breath sounds: Normal breath sounds.   Abdominal:      General: Abdomen is flat.      Palpations: Abdomen is soft.   Musculoskeletal:         General: Normal range of motion.      Cervical back: Normal range of motion and neck supple.   Skin:     General: Skin is warm and dry.      Capillary Refill: Capillary refill takes less than 2 seconds.   Neurological:      General: No focal deficit present.      Mental Status: She is alert.   Psychiatric:         Mood and Affect: Mood normal.         Behavior: Behavior normal.       RADIOLOGY RESULTS   DX-NASAL BONES 3+    Result Date: 1/20/2023 1/20/2023 2:57 PM HISTORY/REASON FOR EXAM:  Nasal pain after injury yesterday.. TECHNIQUE/EXAM DESCRIPTION AND NUMBER OF VIEWS:  3 views of the nasal bones. COMPARISON: None FINDINGS: No nasal bone fracture is identified.  The inferior nasal spine appears intact.  The visualized paranasal sinuses are clear.     1.  Negative nasal bone series.           Assessment/Plan:     Diagnosis and associated orders:     1. Nasal trauma, initial encounter  DX-NASAL BONES 3+         Comments/MDM:     Discussed negative x-rays with patient today, which are reassuring.  Patient was advised that if she is still feeling significant pain experiences new symptoms, repeat x-rays in 7 to 10 days recommended.  She is establishing care on 1/27/2023 with a new provider, who can direct her care related to this injury.  Patient will use ice and anti-inflammatories to the area for pain as needed.         Differential diagnosis, natural history, supportive care, and indications for immediate follow-up discussed.    Advised the patient to  follow-up with the primary care physician for recheck, reevaluation, and consideration of further management.    Please note that this dictation was created using voice recognition software. I have made a reasonable attempt to correct obvious errors, but I expect that there are errors of grammar and possibly content that I did not discover before finalizing the note.    This note was electronically signed by SANDIP Seymour

## 2023-01-27 ENCOUNTER — OFFICE VISIT (OUTPATIENT)
Dept: MEDICAL GROUP | Facility: PHYSICIAN GROUP | Age: 23
End: 2023-01-27
Payer: OTHER GOVERNMENT

## 2023-01-27 VITALS
DIASTOLIC BLOOD PRESSURE: 64 MMHG | WEIGHT: 186.2 LBS | HEART RATE: 90 BPM | BODY MASS INDEX: 27.58 KG/M2 | SYSTOLIC BLOOD PRESSURE: 108 MMHG | OXYGEN SATURATION: 98 % | RESPIRATION RATE: 16 BRPM | TEMPERATURE: 98.6 F | HEIGHT: 69 IN

## 2023-01-27 DIAGNOSIS — J34.89 NASAL PAIN: ICD-10-CM

## 2023-01-27 DIAGNOSIS — E55.9 VITAMIN D DEFICIENCY: ICD-10-CM

## 2023-01-27 DIAGNOSIS — N80.9 ENDOMETRIOSIS DETERMINED BY LAPAROSCOPY: ICD-10-CM

## 2023-01-27 DIAGNOSIS — R63.5 WEIGHT GAIN: ICD-10-CM

## 2023-01-27 DIAGNOSIS — S09.92XS NASAL TRAUMA, SEQUELA: ICD-10-CM

## 2023-01-27 DIAGNOSIS — Z00.00 WELLNESS EXAMINATION: ICD-10-CM

## 2023-01-27 PROCEDURE — 99214 OFFICE O/P EST MOD 30 MIN: CPT | Performed by: FAMILY MEDICINE

## 2023-01-27 RX ORDER — METHENAMINE, SODIUM PHOSPHATE, MONOBASIC, MONOHYDRATE, PHENYL SALICYLATE, METHYLENE BLUE, AND HYOSCYAMINE SULFATE 118; 40.8; 36; 10; .12 MG/1; MG/1; MG/1; MG/1; MG/1
CAPSULE ORAL
COMMUNITY
Start: 2022-12-06 | End: 2023-01-27

## 2023-01-27 RX ORDER — METHENAMINE, SODIUM PHOSPHATE, MONOBASIC, MONOHYDRATE, PHENYL SALICYLATE, METHYLENE BLUE, AND HYOSCYAMINE SULFATE 118; 40.8; 36; 10; .12 MG/1; MG/1; MG/1; MG/1; MG/1
1 CAPSULE ORAL PRN
COMMUNITY
Start: 2022-12-05 | End: 2023-08-10

## 2023-01-27 RX ORDER — HYDROXYZINE PAMOATE 100 MG
CAPSULE ORAL
COMMUNITY
Start: 2022-12-05 | End: 2023-05-04

## 2023-01-27 RX ORDER — HYDROXYZINE PAMOATE 100 MG
CAPSULE ORAL
COMMUNITY
Start: 2022-11-29 | End: 2023-01-27

## 2023-01-27 RX ORDER — DOCUSATE SODIUM AND SENNOSIDES 8.6; 5 MG/1; MG/1
TABLET ORAL
COMMUNITY
Start: 2022-11-14 | End: 2023-01-27

## 2023-01-27 RX ORDER — OXYCODONE HYDROCHLORIDE AND ACETAMINOPHEN 5; 325 MG/1; MG/1
TABLET ORAL
COMMUNITY
Start: 2022-11-12 | End: 2023-05-04

## 2023-01-27 ASSESSMENT — PATIENT HEALTH QUESTIONNAIRE - PHQ9: CLINICAL INTERPRETATION OF PHQ2 SCORE: 0

## 2023-01-27 ASSESSMENT — FIBROSIS 4 INDEX: FIB4 SCORE: 0.35

## 2023-01-27 NOTE — PROGRESS NOTES
Subjective:     CC:   Chief Complaint   Patient presents with    Establish Care       HPI:   Gretel presents today to establish care.  Patient did fall and hit her nose she was not knocked unconscious.  Patient did have x-ray done to her nose January 20 of this year she was told she probably need to get it repeated.  Patient gives a history of having her nose broken a couple of times.  Patient would like to go ahead and get another x-ray done since she still having some pain.  Patient also has a history of migraine she takes Maxalt for it and feels like it is doing well.  Patient is also requesting a referral to GYN due to fact that she has endometriosis and they are working with her.  Patient also with her previous PCP was put on phentermine for weight loss looks like she is was put in for this and she took it for about 6 months last year.  Patient has not taken it to give her body a break but she does have a another months worth of this medication    Past Medical History:   Diagnosis Date    Benign skin mole 06/07/2021    Diarrhea 06/07/2021    Endometriosis     Exercise-induced asthma 06/07/2021    Gynecological disorder 2014    Endometriosis    Migraine        Social History     Tobacco Use    Smoking status: Never    Smokeless tobacco: Never   Vaping Use    Vaping Use: Never used   Substance Use Topics    Alcohol use: Yes     Alcohol/week: 1.2 oz     Types: 2 Standard drinks or equivalent per week     Comment: 2-3 a week     Drug use: Never       Current Outpatient Medications Ordered in Epic   Medication Sig Dispense Refill    oxyCODONE-acetaminophen (PERCOCET) 5-325 MG Tab TAKE 1 TABLET BY MOUTH EVERY 6 HOURS FOR 7 DAYS AS NEEDED      Meth-Hyo-M Bl-Na Phos-Ph Sal (URIBEL) 118 MG Cap       hydrOXYzine pamoate (VISTARIL) 100 MG Cap       phentermine 37.5 MG capsule Take 1 Capsule by mouth every morning.      ibuprofen (MOTRIN) 800 MG Tab Take 1 Tablet by mouth every 8 hours as needed.      rizatriptan (MAXALT)  "10 MG tablet Take 1 Tablet by mouth one time as needed for Migraine for up to 1 dose. 10 Tablet 11    ondansetron (ZOFRAN) 4 MG Tab tablet Take 1 Tablet by mouth every 6 hours as needed for Nausea/Vomiting. 20 Each 0    Levonorgestrel (LILETTA, 52 MG, IU) by Intrauterine route.       No current [facility]-ordered facility-administered medications on file.       Allergies:  Patient has no known allergies.    Health Maintenance: Completed    ROS:  Gen: no fevers/chills, patient has gained 11 pounds in 2 months  Eyes: no changes in vision  ENT: no sore throat, no hearing loss, no bloody nose  Pulm: no sob, no cough  CV: no chest pain, no palpitations  GI: no nausea/vomiting, no diarrhea  Neuro: no headaches, no numbness/tingling  Heme/Lymph: no easy bruising    Objective:     Exam:  /64 (BP Location: Left arm, Patient Position: Sitting, BP Cuff Size: Adult)   Pulse 90   Temp 37 °C (98.6 °F) (Temporal)   Resp 16   Ht 1.753 m (5' 9\")   Wt 84.5 kg (186 lb 3.2 oz)   SpO2 98%   BMI 27.50 kg/m²  Body mass index is 27.5 kg/m².    Gen: Alert and oriented, No apparent distress.  Skin: Warm and dry.  No obvious lesions.  Eyes: Sclera wnl Pupils normal in size  ENT: Ears TMs intact mouth negative redness or exudates.  On nostril examination patient's turbinates appear to be swollen.  Lungs: Normal effort, CTA bilaterally, no wheezes, rhonchi, or rales  CV: Regular rate and rhythm. No murmurs, rubs, or gallops.  Musculoskeletal: Normal gait. No extremity cyanosis, clubbing, or edema.  Neuro: Oriented to person, place and time  Psych: Mood is wnl       Labs: Patient given listing of all the [facility] labs would recommend we get her lab work done fasting    Assessment & Plan:     22 y.o. female with the following -     1. Nasal pain  Recommend she just go ahead and get the x-ray done next week.  I did warn patient I will be out of town next week and so I will contact her the week after next.  Patient feels that if her nasal " x-ray is within normal limits she does not need a referral to ear nose and throat.  - DX-NASAL BONES 3+; Future    2. Nasal trauma, sequela  - CBC WITH DIFFERENTIAL; Future    3. Weight gain  I would recommend patient get her lab work done first I need to see her back in about a month.  I did talk to patient about phentermine usually only works for the first 3 months and I also mention it is a controlled substance and can be addicting.  This is a chronic problem  - TSH; Future  - TRIIDOTHYRONINE; Future    4. Endometriosis determined by laparoscopy  We will write referral to GYN this is a chronic problem    5. Vitamin D deficiency  - VITAMIN D,25 HYDROXY (DEFICIENCY); Future    6. Wellness examination  - Comp Metabolic Panel; Future  - Lipid Profile; Future    Return in about 3 weeks (around 2/17/2023), or if symptoms worsen or fail to improve.    Please note that this dictation was created using voice recognition software. I have made every reasonable attempt to correct obvious errors, but I expect that there are errors of grammar and possibly content that I did not discover before finalizing the note.

## 2023-03-30 RX ORDER — RIZATRIPTAN BENZOATE 10 MG/1
10 TABLET ORAL
Qty: 10 TABLET | Refills: 2 | Status: SHIPPED | OUTPATIENT
Start: 2023-03-30 | End: 2023-08-07 | Stop reason: SDUPTHER

## 2023-05-04 ENCOUNTER — OFFICE VISIT (OUTPATIENT)
Dept: MEDICAL GROUP | Facility: PHYSICIAN GROUP | Age: 23
End: 2023-05-04
Payer: OTHER GOVERNMENT

## 2023-05-04 VITALS
OXYGEN SATURATION: 95 % | HEART RATE: 73 BPM | HEIGHT: 69 IN | RESPIRATION RATE: 16 BRPM | BODY MASS INDEX: 27.58 KG/M2 | TEMPERATURE: 97.8 F | DIASTOLIC BLOOD PRESSURE: 64 MMHG | SYSTOLIC BLOOD PRESSURE: 102 MMHG | WEIGHT: 186.2 LBS

## 2023-05-04 DIAGNOSIS — E78.00 HYPERCHOLESTEROLEMIA: ICD-10-CM

## 2023-05-04 DIAGNOSIS — G43.909 MIGRAINE WITHOUT STATUS MIGRAINOSUS, NOT INTRACTABLE, UNSPECIFIED MIGRAINE TYPE: ICD-10-CM

## 2023-05-04 DIAGNOSIS — E66.3 OVERWEIGHT (BMI 25.0-29.9): ICD-10-CM

## 2023-05-04 DIAGNOSIS — E55.9 VITAMIN D DEFICIENCY: ICD-10-CM

## 2023-05-04 DIAGNOSIS — D58.2 ABNORMAL HEMOGLOBIN (HCC): ICD-10-CM

## 2023-05-04 PROCEDURE — 99214 OFFICE O/P EST MOD 30 MIN: CPT | Performed by: FAMILY MEDICINE

## 2023-05-04 ASSESSMENT — FIBROSIS 4 INDEX: FIB4 SCORE: 0.36

## 2023-05-04 NOTE — PROGRESS NOTES
Subjective:     CC:   Chief Complaint   Patient presents with    Follow-Up       HPI:   Gretel presents today for follow-up.  Patient had been sick when she got her labs done but now she is feeling better.  Patient reports that she is using her Maxalt at least 3 times a week.  Patient agreeable to see neurology to see if there is other options to decrease her migraines.  Patient is also here for her lipid panel results.  Patient noticed her hct is elevated.    Past Medical History:   Diagnosis Date    Benign skin mole 06/07/2021    Diarrhea 06/07/2021    Endometriosis     Exercise-induced asthma 06/07/2021    Gynecological disorder 2014    Endometriosis    Migraine        Social History     Tobacco Use    Smoking status: Never    Smokeless tobacco: Never   Vaping Use    Vaping Use: Never used   Substance Use Topics    Alcohol use: Yes     Alcohol/week: 1.2 oz     Types: 2 Standard drinks or equivalent per week     Comment: 2-3 a week     Drug use: Never       Current Outpatient Medications Ordered in Epic   Medication Sig Dispense Refill    rizatriptan (MAXALT) 10 MG tablet Take 1 Tablet by mouth one time as needed for Migraine for up to 1 dose. 10 Tablet 2    Meth-Hyo-M Bl-Na Phos-Ph Sal (URIBEL) 118 MG Cap       phentermine 37.5 MG capsule Take 1 Capsule by mouth every morning.      ibuprofen (MOTRIN) 800 MG Tab Take 1 Tablet by mouth every 8 hours as needed.      ondansetron (ZOFRAN) 4 MG Tab tablet Take 1 Tablet by mouth every 6 hours as needed for Nausea/Vomiting. 20 Each 0    Levonorgestrel (LILETTA, 52 MG, IU) by Intrauterine route.       No current [facility]-ordered facility-administered medications on file.       Allergies:  Patient has no known allergies.    Health Maintenance: Completed    ROS:  Gen: no fevers/chills, no changes in weight  Eyes: no changes in vision  ENT: no sore throat, no hearing loss, no bloody nose  Pulm: no sob, no cough  CV: no chest pain, no palpitations  GI: no nausea/vomiting, no  "diarrhea  : no dysuria  Neuro: no headaches, no numbness/tingling  Heme/Lymph: no easy bruising    Objective:     Exam:  /64 (BP Location: Left arm, Patient Position: Sitting, BP Cuff Size: Adult)   Pulse 73   Temp 36.6 °C (97.8 °F) (Temporal)   Resp 16   Ht 1.74 m (5' 8.5\")   Wt 84.5 kg (186 lb 3.2 oz)   SpO2 95%   BMI 27.90 kg/m²  Body mass index is 27.9 kg/m².    Gen: Alert and oriented, No apparent distress.  Skin: Warm and dry.  No obvious lesions.  Eyes: Sclera wnl Pupils normal in size  Lungs: Normal effort, CTA bilaterally, no wheezes, rhonchi, or rales  CV: Regular rate and rhythm. No murmurs, rubs, or gallops.  Musculoskeletal: Normal gait. No extremity cyanosis, clubbing, or edema.  Neuro: Oriented to person, place and time  Psych: Mood is wnl       Assessment & Plan:     23 y.o. female with the following -     1. Hypercholesterolemia  I would recommend placing patient on a low-fat diet we will recheck this labs again in 3 months I have already preordered her labs  - Lipid Profile; Future  - Comp Metabolic Panel; Future    2. Migraine without status migrainosus, not intractable, unspecified migraine type  Patient very agreeable to see neurology due to fact that her migraines are occurring frequently.  - Referral to Neurology    3. Overweight (BMI 25.0-29.9)  Patient to modify her diet we will see how her weight is  I see her    4. Vitamin D deficiency  Patient to start taking over-the-counter vitamin D we will recheck this again  - VITAMIN D,25 HYDROXY (DEFICIENCY); Future    5. Abnormal hemoglobin (HCC)  Patient may have been slightly dry in her hemoglobin slightly elevated there are some values in the past that her hemoglobin was high in normal we will recheck this again as planned along with the cholesterol  - CBC WITH DIFFERENTIAL; Future       Return in about 4 months (around 9/4/2023), or if symptoms worsen or fail to improve.    Please note that this dictation was created using voice " recognition software. I have made every reasonable attempt to correct obvious errors, but I expect that there are errors of grammar and possibly content that I did not discover before finalizing the note.

## 2023-06-28 ENCOUNTER — APPOINTMENT (OUTPATIENT)
Dept: MEDICAL GROUP | Facility: PHYSICIAN GROUP | Age: 23
End: 2023-06-28
Payer: OTHER GOVERNMENT

## 2023-06-30 ENCOUNTER — OFFICE VISIT (OUTPATIENT)
Dept: MEDICAL GROUP | Facility: PHYSICIAN GROUP | Age: 23
End: 2023-06-30
Payer: OTHER GOVERNMENT

## 2023-06-30 VITALS
BODY MASS INDEX: 27.73 KG/M2 | RESPIRATION RATE: 16 BRPM | WEIGHT: 187.2 LBS | HEART RATE: 74 BPM | SYSTOLIC BLOOD PRESSURE: 104 MMHG | OXYGEN SATURATION: 97 % | DIASTOLIC BLOOD PRESSURE: 72 MMHG | TEMPERATURE: 97.7 F | HEIGHT: 69 IN

## 2023-06-30 DIAGNOSIS — N89.8 VAGINAL IRRITATION: ICD-10-CM

## 2023-06-30 PROCEDURE — 3074F SYST BP LT 130 MM HG: CPT | Performed by: FAMILY MEDICINE

## 2023-06-30 PROCEDURE — 99213 OFFICE O/P EST LOW 20 MIN: CPT | Performed by: FAMILY MEDICINE

## 2023-06-30 PROCEDURE — 3078F DIAST BP <80 MM HG: CPT | Performed by: FAMILY MEDICINE

## 2023-06-30 ASSESSMENT — FIBROSIS 4 INDEX: FIB4 SCORE: 0.36

## 2023-06-30 NOTE — PROGRESS NOTES
"Subjective:     CC:   Chief Complaint   Patient presents with    Follow-Up       HPI:   Gretel presents today thinking she saw some bruising to her left labia about 3 to 5 days ago.  Patient also brings paperwork apparently her  is going to be stationed either in [place] or [place] so they need paperwork filled out through the  that she can go through isolated locations.    Past Medical History:   Diagnosis Date    Benign skin mole 06/07/2021    Diarrhea 06/07/2021    Endometriosis     Exercise-induced asthma 06/07/2021    Gynecological disorder 2014    Endometriosis    Migraine        Social History     Tobacco Use    Smoking status: Never    Smokeless tobacco: Never   Vaping Use    Vaping Use: Never used   Substance Use Topics    Alcohol use: Yes     Alcohol/week: 1.2 oz     Types: 2 Standard drinks or equivalent per week     Comment: 2-3 a week     Drug use: Never       Current Outpatient Medications Ordered in Epic   Medication Sig Dispense Refill    rizatriptan (MAXALT) 10 MG tablet Take 1 Tablet by mouth one time as needed for Migraine for up to 1 dose. 10 Tablet 2    Meth-Hyo-M Bl-Na Phos-Ph Sal (URIBEL) 118 MG Cap       ibuprofen (MOTRIN) 800 MG Tab Take 1 Tablet by mouth every 8 hours as needed.      ondansetron (ZOFRAN) 4 MG Tab tablet Take 1 Tablet by mouth every 6 hours as needed for Nausea/Vomiting. 20 Each 0    Levonorgestrel (LILETTA, 52 MG, IU) by Intrauterine route.       No current [facility]-ordered facility-administered medications on file.       Allergies:  Patient has no known allergies.    Health Maintenance: Completed    ROS:  Gen: no fevers/chills, no changes in weight  Eyes: no changes in vision  Heme/Lymph: no easy bruising    Objective:     Exam:  /72 (BP Location: Right arm, Patient Position: Sitting, BP Cuff Size: Adult)   Pulse 74   Temp 36.5 °C (97.7 °F) (Temporal)   Resp 16   Ht 1.74 m (5' 8.5\")   Wt 84.9 kg (187 lb 3.2 oz)   SpO2 97%   BMI 28.05 kg/m²  Body mass " index is 28.05 kg/m².    Gen: Alert and oriented, No apparent distress.  Skin: Warm and dry.  No obvious lesions.  Eyes: Sclera wnl Pupils normal in size  Genitalia: On examination that area looks normal to me there is no blue blueness to it.  I did have a mirror to show her but I used her cell phone just to take the picture to show her.  I recommend she delete that picture off her cell phone.  Neuro: Oriented to person, place and time  Psych: Mood is wnl     A chaperone was offered to the patient during today's exam. Chaperone name: Shawnee Malave MA was present.      Assessment & Plan:     23 y.o. female with the following -     1. Vaginal irritation  I informed patient on her exam that area appears to be within normal limits.  Patient viewed the picture also agrees that it looks different when she was concerned.  I did tell her that sometimes engorgement to that area can cause things to be more swollen.  I would recommend patient make a follow-up appointment in order to review the paperwork she needs to be filled out in order to go remote.       Return in about 4 weeks (around 7/28/2023), or if symptoms worsen or fail to improve.    Please note that this dictation was created using voice recognition software. I have made every reasonable attempt to correct obvious errors, but I expect that there are errors of grammar and possibly content that I did not discover before finalizing the note.

## 2023-07-11 ENCOUNTER — OFFICE VISIT (OUTPATIENT)
Dept: NEUROLOGY | Facility: MEDICAL CENTER | Age: 23
End: 2023-07-11
Attending: PSYCHIATRY & NEUROLOGY
Payer: OTHER GOVERNMENT

## 2023-07-11 VITALS
DIASTOLIC BLOOD PRESSURE: 74 MMHG | WEIGHT: 191.8 LBS | HEIGHT: 69 IN | OXYGEN SATURATION: 98 % | SYSTOLIC BLOOD PRESSURE: 118 MMHG | BODY MASS INDEX: 28.41 KG/M2 | RESPIRATION RATE: 17 BRPM | HEART RATE: 70 BPM | TEMPERATURE: 97.7 F

## 2023-07-11 DIAGNOSIS — G43.E09 CHRONIC MIGRAINE WITH AURA: ICD-10-CM

## 2023-07-11 PROCEDURE — 3078F DIAST BP <80 MM HG: CPT | Performed by: PSYCHIATRY & NEUROLOGY

## 2023-07-11 PROCEDURE — 3074F SYST BP LT 130 MM HG: CPT | Performed by: PSYCHIATRY & NEUROLOGY

## 2023-07-11 PROCEDURE — 99204 OFFICE O/P NEW MOD 45 MIN: CPT | Performed by: PSYCHIATRY & NEUROLOGY

## 2023-07-11 PROCEDURE — 99211 OFF/OP EST MAY X REQ PHY/QHP: CPT | Performed by: PSYCHIATRY & NEUROLOGY

## 2023-07-11 ASSESSMENT — FIBROSIS 4 INDEX: FIB4 SCORE: 0.36

## 2023-07-11 NOTE — PROGRESS NOTES
"[facility] NEUROLOGY  GENERAL NEUROLOGY  NEW PATIENT VISIT    Referral source: Mimi Grande MD    CC: \"migraine...\"    HISTORY OF ILLNESS:  Gretel Peñaloza is a 23 y.o. woman with a history most notable for migraine and HLD.  Today, she was unaccompanied, and she provided the following history:    The following is a summary of headache symptoms, presented in my standard format:    Family History: father  Age at onset (years): 15  Location: left supra-orbital  Radiation: rarely posterior cervical  Frequency: baseline: 5-6 times/week  Duration: baseline: 2-3 hours, with treatment: 30-60 minutes  Headache Days/Month: 28-29/30  Quality: \"pounding, throbbing, pressure\"  Intensity: 8-9/10  Aura: visual  Photophobia/Phonophobia/Nausea/Vomiting: yes/no/yes/rarely  Provoked by Physical Activity?: none  Triggers: stress, often no obvious triggers  Associated Symptoms: none  Autonomic Signs (such as ptosis, miosis, conjunctival injection, rhinorrhea, increased lacrimation): some scleral redness  Head Trauma: broken nose x3, surgery x2  Association with Menses: no cycles  ED Visits: yes, most recently in high school  Hospitalizations: no  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, 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  Sleep (hours/night): 6-7 hours/night  Caffeine Intake: 12 oz coffee/day  Hydration: 80 oz/day  Nutrition: skips breakfast, some snacks  Exercise: 3x/week  Analgesic Overuse:     Current Medication Regimen:  - ibuprofen: sometimes daily  - acetaminophen:   - rizatriptan: helpful, uses this often    Medications Tried: Response  Preventive:  - nortriptyline: 50 mg was ineffective  - topiramate: ineffective    Rescue:  -     Medications Not Tried:  -     MEDICAL AND SURGICAL HISTORY:  Past Medical History:   Diagnosis Date    Benign skin mole 06/07/2021    Diarrhea 06/07/2021    Endometriosis     Exercise-induced asthma 06/07/2021    Gynecological disorder 2014    Endometriosis    Migraine      Past Surgical History:   Procedure " Laterality Date    WI PELVIC EXAMINATION W ANESTH N/A 11/15/2022    Procedure: PELVIC EXAM UNDER ANESTHESIA, PELVISCOPY, LYSIS OF ADHESIONS, EXCISION FULGURATION ENDOMETRIOSIS IMPLANTS, CYSTOSCOPY WITH BLADDER DISTENSION AND ANY OTHER MEDICALLY NECESSARY PROCEDURES;  Surgeon: Sulma Diaz M.D.;  Location: SURGERY SAME DAY [facility];  Service: Gynecology    WI LAP,FULGURATE/EXCISE LESIONS  11/15/2022    Procedure: EXCISION OR FULGURATION, ENDOMETRIOSIS, LAPAROSCOPIC;  Surgeon: Sulma Diaz M.D.;  Location: SURGERY SAME DAY [facility];  Service: Gynecology    ENDOMETRIAL ABLATION      GYN SURGERY  2014    Laproscopy    OTHER  2013,2014    Nasal reduction     MEDICATIONS:  Current Outpatient Medications   Medication Sig    rizatriptan (MAXALT) 10 MG tablet Take 1 Tablet by mouth one time as needed for Migraine for up to 1 dose.    Meth-Hyo-M Bl-Na Phos-Ph Sal (URIBEL) 118 MG Cap Take 1 Capsule by mouth as needed.    ibuprofen (MOTRIN) 800 MG Tab Take 1 Tablet by mouth every 8 hours as needed.    ondansetron (ZOFRAN) 4 MG Tab tablet Take 1 Tablet by mouth every 6 hours as needed for Nausea/Vomiting.    Levonorgestrel (LILETTA, 52 MG, IU) by Intrauterine route. Placed 2020     SOCIAL HISTORY:  Social History     Tobacco Use    Smoking status: Never    Smokeless tobacco: Never   Vaping Use    Vaping Use: Never used   Substance Use Topics    Alcohol use: Yes     Alcohol/week: 1.2 oz     Types: 2 Standard drinks or equivalent per week     Comment: 2-3 a week      Social History     Social History Narrative    Not on file     FAMILY HISTORY:  Family History   Problem Relation Age of Onset    Hypertension Father     Drug abuse Father     Heart Disease Maternal Grandmother     Diabetes Paternal Grandmother     Hypertension Paternal Grandmother     Cancer Paternal Grandfather         liver, skin     REVIEW OF SYSTEMS:  A ROS was completed.  Pertinent positives and negatives were included in the HPI, above.   All other systems were reviewed and are negative.    PHYSICAL EXAM:  General/Medical:  - NAD  - hair, skin, nails, and joints were normal    Neuro:  MENTAL STATUS: awake and alert; no deficits of speech or language; oriented to person, place, and time; affect was appropriate to situation; pleasant, cooperative    CRANIAL NERVES:    II: acuity: J1/J1+ with glasses, fields: intact to confrontation, pupils: 3/3 to 2/2 without a relative afferent pupillary defect, discs: sharp    III/IV/VI: versions: intact without nystagmus    V: facial sensation: symmetric to light touch    VII: facial expression: symmetric    VIII: hearing: intact to finger rub    IX/X: palate: elevates symmetrically    XI: shoulder shrug: symmetric    XII: tongue: midline    MOTOR:  - bulk: normal throughout  - tone: normal in the upper extremities  Upper Extremity Strength  (R/L)    5/5   Elbow flexion 5/5   Elbow extension 5/5   Shoulder abduction 5/5     Lower Extremity Strength  (R/L)   Hip flexion 5/5   Knee extension 5/5   Knee flexion NT   Ankle plantarflexion NT   Ankle dorsiflexion NT     - heel-walk: NT  - toe-walk: NT  - pronator drift: NT  - abnormal movements: none    SENSATION:  - light touch: grossly intact over the upper- and lower extremities  - vibration (R/L, seconds): NT/NT at the great toes  - pinprick: NT  - proprioception: NT  - Romberg: absent    COORDINATION:  - finger to nose: normal, no ataxia on exam  - finger tapping: rapid and accurate, bilaterally    REFLEXES:  Reflex Right Left   BR 2+ 2+   Biceps 2+ 2+   Triceps 2+ 2+   Patellae NT NT   Achilles NT NT   Toes NT NT     GAIT:  - narrow base and normal  - heel-walk: NT  - toe-walk: NT  - tandem gait: NT    REVIEW OF IMAGING STUDIES:  No recent data available.    REVIEW OF LABORATORY STUDIES:  No recent data available.    ASSESSMENT:  Gretel Peñaloza is a 23 y.o. woman with chronic migraine with aura as well as HLD.  Plans/recommendations as  follows:    PLAN:  Prevention:  - start Botox: plan to inject 155 units according to the dosing/injection paradigm currently mandated by the FDA for chronic migraine as follows:  - 10 units of BOTOX divided into 2 sites into the   - 5 units into the Procerus  - 20 units of Botox divided into 4 units into the Frontalis  - 40 units of Botox divided into 8 sites (4 sites to the right Temporalis and 4 sites to the left Temporalis)  - 30 units divided into 6 units (3 units to the right Occipitalis and 3 units to left Occipitalis)  - 20 units divided into 4 units (2 units to the right Cervical paraspinals, 2 units to the left Cervical paraspinals)  - 30 units of Botox divided into 6 units (3 units to right trapezius, 3 units to the left trapezius).    - try supplementing:  - magnesium: 400-600 mg once or 200-300 mg twice daily  - riboflavin (vitamin B2): 400 mg once daily  - coenzyme Q10: 300 mg daily  - get 7-9 hours of sleep per night; can try supplementing melatonin 2-10 mg, 2-3 hours before bedtime  - drink plenty of fluids (urine should be nearly clear)  - avoid excessive caffeine intake (no more than 2 servings per day and nothing in the afternoon)  - eat regular meals (don't skip meals)  - get moderate exercise (even just a 20 minute walk daily)    Rescue:  - continue rizatriptan 10 mg: take this at the onset of aura or headache pain; may re-dose x1 after 2 hours if headache persists; do not use more than 2 days/week  - do not use analgesics (e.g., ibuprofen, acetaminophen) more than 2 days per week in order to avoid analgesic rebound headaches    - keep a headache log    Follow-Up:  - Return in about 5 months (around 12/11/2023).    Signed: Jean Carlos Gloria M.D.

## 2023-07-25 ENCOUNTER — OFFICE VISIT (OUTPATIENT)
Dept: URGENT CARE | Facility: CLINIC | Age: 23
End: 2023-07-25
Payer: OTHER GOVERNMENT

## 2023-07-25 VITALS
RESPIRATION RATE: 20 BRPM | DIASTOLIC BLOOD PRESSURE: 68 MMHG | HEART RATE: 90 BPM | OXYGEN SATURATION: 97 % | BODY MASS INDEX: 28.14 KG/M2 | HEIGHT: 69 IN | WEIGHT: 190 LBS | TEMPERATURE: 98.2 F | SYSTOLIC BLOOD PRESSURE: 124 MMHG

## 2023-07-25 DIAGNOSIS — U07.1 COVID-19 VIRUS INFECTION: ICD-10-CM

## 2023-07-25 DIAGNOSIS — J06.9 VIRAL URI WITH COUGH: ICD-10-CM

## 2023-07-25 DIAGNOSIS — B08.5 ENTEROVIRAL VESICULAR PHARYNGITIS: ICD-10-CM

## 2023-07-25 LAB
FLUAV RNA SPEC QL NAA+PROBE: NEGATIVE
FLUBV RNA SPEC QL NAA+PROBE: NEGATIVE
RSV RNA SPEC QL NAA+PROBE: NEGATIVE
SARS-COV-2 RNA RESP QL NAA+PROBE: POSITIVE

## 2023-07-25 PROCEDURE — 3074F SYST BP LT 130 MM HG: CPT | Performed by: STUDENT IN AN ORGANIZED HEALTH CARE EDUCATION/TRAINING PROGRAM

## 2023-07-25 PROCEDURE — 0241U POCT CEPHEID COV-2, FLU A/B, RSV - PCR: CPT | Performed by: STUDENT IN AN ORGANIZED HEALTH CARE EDUCATION/TRAINING PROGRAM

## 2023-07-25 PROCEDURE — 99213 OFFICE O/P EST LOW 20 MIN: CPT | Performed by: STUDENT IN AN ORGANIZED HEALTH CARE EDUCATION/TRAINING PROGRAM

## 2023-07-25 PROCEDURE — 3078F DIAST BP <80 MM HG: CPT | Performed by: STUDENT IN AN ORGANIZED HEALTH CARE EDUCATION/TRAINING PROGRAM

## 2023-07-25 RX ORDER — ACETAMINOPHEN 500 MG
1000 TABLET ORAL EVERY 6 HOURS PRN
COMMUNITY
End: 2023-08-10

## 2023-07-25 ASSESSMENT — FIBROSIS 4 INDEX: FIB4 SCORE: 0.36

## 2023-07-25 NOTE — LETTER
July 25, 2023       Patient: Gretel Peñaloza   YOB: 2000   Date of Visit: 7/25/2023         To Whom It May Concern:    Gretel Peñaloza was seen in urgent care today and tested positive for COVID-19.  Per CDC quarantine guidelines she is cleared to return to work on Friday of this week.    If you have any questions or concerns, please don't hesitate to call [phone]          Sincerely,          Rommel Valentine D.O.  Electronically Signed

## 2023-07-25 NOTE — LETTER
July 25, 2023       Patient: Gretel Peñaloza   YOB: 2000   Date of Visit: 7/25/2023         To Whom It May Concern:    Gretel Peñaloza was seen in urgent care today.    If you have any questions or concerns, please don't hesitate to call [phone]          Sincerely,          Rommel Valentine D.O.  Electronically Signed

## 2023-07-25 NOTE — PROGRESS NOTES
Subjective:   CHIEF COMPLAINT  Chief Complaint   Patient presents with    Cough     Cough, congestion, body aches, fever x 3 days, took 2 home Covid tests: Positive( since 2023       HPI  Gretel Peñaloza is a 23 y.o. female who presents with a chief complaint of body aches, fatigue, nasal congestion and cough x3 days.  She tried Tylenol and ibuprofen which helps.  Positive ROS for tactile fever.  No wheezing or shortness of breath.  No history of asthma or tobacco abuse.  No known sick contacts.  She took a home COVID test which was positive but said it was  so wanted to get repeat testing today.    REVIEW OF SYSTEMS  General: no fever or chills  GI: no nausea or vomiting  See HPI for further details.    PAST MEDICAL HISTORY  Patient Active Problem List    Diagnosis Date Noted    Vaginal irritation 2023    Hypercholesterolemia 2023    Vitamin D deficiency 2023    Nasal trauma, sequela 2023    Overweight (BMI 25.0-29.9) 03/10/2022    Migraine 10/03/2018    Endometriosis determined by laparoscopy 2014       SURGICAL HISTORY   has a past surgical history that includes endometrial ablation; gyn surgery (); other (,); pelvic examination w anesth (N/A, 11/15/2022); and lap,fulgurate/excise lesions (11/15/2022).    ALLERGIES  No Known Allergies    CURRENT MEDICATIONS  Home Medications       Reviewed by Rommel Valentine D.O. (Physician) on 23 at 1042  Med List Status: <None>     Medication Last Dose Status   acetaminophen (TYLENOL) 500 MG Tab  Active   ibuprofen (MOTRIN) 800 MG Tab PRN Active   Levonorgestrel (LILETTA, 52 MG, IU) Taking Active   Meth-Hyo-M Bl-Na Phos-Ph Sal (URIBEL) 118 MG Cap Taking Active   ondansetron (ZOFRAN) 4 MG Tab tablet PRN Active   rizatriptan (MAXALT) 10 MG tablet Taking Active                    SOCIAL HISTORY  Social History     Tobacco Use    Smoking status: Never    Smokeless tobacco: Never   Vaping Use    Vaping  "Use: Never used   Substance and Sexual Activity    Alcohol use: Not Currently     Alcohol/week: 1.2 oz     Types: 2 Standard drinks or equivalent per week     Comment: 2-3 a week     Drug use: Never    Sexual activity: Yes     Partners: Male     Birth control/protection: I.U.D.       FAMILY HISTORY  Family History   Problem Relation Age of Onset    Hypertension Father     Drug abuse Father     Heart Disease Maternal Grandmother     Diabetes Paternal Grandmother     Hypertension Paternal Grandmother     Cancer Paternal Grandfather         liver, skin          Objective:   PHYSICAL EXAM  VITAL SIGNS: /68 (BP Location: Left arm, Patient Position: Sitting, BP Cuff Size: Adult)   Pulse 90   Temp 36.8 °C (98.2 °F) (Temporal)   Resp 20   Ht 1.753 m (5' 9\")   Wt 86.2 kg (190 lb)   SpO2 97%   BMI 28.06 kg/m²     Gen: no acute distress, normal voice  Skin: dry, intact, moist mucosal membranes  Eyes: No conjunctival injection b/l  Neck: Normal range of motion. No meningeal signs.   ENT: No oropharyngeal erythema or exudates.  Isolated erythematous ulcer along the hard palate left side of mouth.  Uvula midline. TMs clear and intact b/l w/o bulging, erythema or effusion. No lymphadenopathy.  Lungs: No increased work of breathing.  CTAB w/ symmetric expansion  CV: RRR w/o murmurs or clicks  Psych: normal affect, normal judgement, alert, awake    POC covid (PCR w/ cepheid): positive    Assessment/Plan:     1. COVID-19 virus infection        2. Viral URI with cough  POCT CoV-2, Flu A/B, RSV by PCR      3. Enteroviral vesicular pharyngitis        Patient tested positive for COVID-19 which would explain her symptoms.  She was informed of the test result over the phone.  She is otherwise well-appearing and nontoxic.  Recommended Flonase and Zyrtec to help with congestion.  Continue Tylenol ibuprofen to help with body aches.  Discussed CDC quarantine guidelines and provided a note for work.  Return to urgent care any " new/worsening symptoms or further questions or concerns.  Patient understood everything discussed.  All questions were answered.      Differential diagnosis and supportive care discussed. Follow-up as needed if symptoms worsen or fail to improve to PCP, Urgent care or Emergency Room.    Please note that this dictation was created using voice recognition software. I have made a reasonable attempt to correct obvious errors, but I expect that there are errors of grammar and possibly content that I did not discover before finalizing the note.

## 2023-08-09 RX ORDER — RIZATRIPTAN BENZOATE 10 MG/1
10 TABLET ORAL
Qty: 10 TABLET | Refills: 0 | Status: SHIPPED | OUTPATIENT
Start: 2023-08-09 | End: 2023-08-10

## 2023-08-10 ENCOUNTER — OFFICE VISIT (OUTPATIENT)
Dept: URGENT CARE | Facility: CLINIC | Age: 23
End: 2023-08-10
Payer: OTHER GOVERNMENT

## 2023-08-10 ENCOUNTER — HOSPITAL ENCOUNTER (OUTPATIENT)
Facility: MEDICAL CENTER | Age: 23
End: 2023-08-10
Payer: OTHER GOVERNMENT

## 2023-08-10 VITALS
RESPIRATION RATE: 15 BRPM | WEIGHT: 184 LBS | DIASTOLIC BLOOD PRESSURE: 70 MMHG | SYSTOLIC BLOOD PRESSURE: 116 MMHG | HEART RATE: 76 BPM | BODY MASS INDEX: 27.25 KG/M2 | OXYGEN SATURATION: 98 % | TEMPERATURE: 97.8 F | HEIGHT: 69 IN

## 2023-08-10 DIAGNOSIS — N30.01 ACUTE CYSTITIS WITH HEMATURIA: ICD-10-CM

## 2023-08-10 DIAGNOSIS — R30.0 DYSURIA: ICD-10-CM

## 2023-08-10 LAB
APPEARANCE UR: CLEAR
BILIRUB UR STRIP-MCNC: NEGATIVE MG/DL
CANDIDA DNA VAG QL PROBE+SIG AMP: NEGATIVE
COLOR UR AUTO: YELLOW
G VAGINALIS DNA VAG QL PROBE+SIG AMP: NEGATIVE
GLUCOSE UR STRIP.AUTO-MCNC: NEGATIVE MG/DL
KETONES UR STRIP.AUTO-MCNC: NEGATIVE MG/DL
LEUKOCYTE ESTERASE UR QL STRIP.AUTO: NEGATIVE
NITRITE UR QL STRIP.AUTO: NEGATIVE
PH UR STRIP.AUTO: 7 [PH] (ref 5–8)
POCT INT CON NEG: NEGATIVE
POCT INT CON POS: POSITIVE
POCT URINE PREGNANCY TEST: NEGATIVE
PROT UR QL STRIP: NEGATIVE MG/DL
RBC UR QL AUTO: NORMAL
SP GR UR STRIP.AUTO: 1.02
T VAGINALIS DNA VAG QL PROBE+SIG AMP: NEGATIVE
UROBILINOGEN UR STRIP-MCNC: 0.2 MG/DL

## 2023-08-10 PROCEDURE — 87480 CANDIDA DNA DIR PROBE: CPT

## 2023-08-10 PROCEDURE — 87660 TRICHOMONAS VAGIN DIR PROBE: CPT

## 2023-08-10 PROCEDURE — 3078F DIAST BP <80 MM HG: CPT

## 2023-08-10 PROCEDURE — 87510 GARDNER VAG DNA DIR PROBE: CPT

## 2023-08-10 PROCEDURE — 81002 URINALYSIS NONAUTO W/O SCOPE: CPT

## 2023-08-10 PROCEDURE — 99213 OFFICE O/P EST LOW 20 MIN: CPT

## 2023-08-10 PROCEDURE — 3074F SYST BP LT 130 MM HG: CPT

## 2023-08-10 PROCEDURE — 81025 URINE PREGNANCY TEST: CPT

## 2023-08-10 RX ORDER — NITROFURANTOIN 25; 75 MG/1; MG/1
100 CAPSULE ORAL EVERY 12 HOURS
Qty: 10 CAPSULE | Refills: 0 | Status: SHIPPED | OUTPATIENT
Start: 2023-08-10 | End: 2023-08-15

## 2023-08-10 ASSESSMENT — ENCOUNTER SYMPTOMS
SHORTNESS OF BREATH: 0
FEVER: 0
FLANK PAIN: 0
ABDOMINAL PAIN: 0

## 2023-08-10 ASSESSMENT — FIBROSIS 4 INDEX: FIB4 SCORE: 0.36

## 2023-08-10 NOTE — PROGRESS NOTES
Subjective:     CHIEF COMPLAINT  Chief Complaint   Patient presents with    Dysuria       HPI  Gretel Peñaloza is a very pleasant 23 y.o. female who presents with a stinging sensation near her urethra for the past several days.  She reports that the stinging sometimes happens after urinating.  She has not experienced any increased urgency or frequency to urinate.  She has had BV in the past with similar symptoms of stinging.  Additionally she does have a fissure on her labia minora, which has been present for the past 2 weeks.  She reports that the fissure does not have any surrounding erythema, edema, or pus.  She is not currently menstruating.  She is not experiencing any back/flank/kidney pain.    REVIEW OF SYSTEMS  Review of Systems   Constitutional:  Negative for fever and malaise/fatigue.   Respiratory:  Negative for shortness of breath.    Cardiovascular:  Negative for chest pain.   Gastrointestinal:  Negative for abdominal pain.   Genitourinary:  Positive for dysuria. Negative for flank pain, frequency and urgency.       PAST MEDICAL HISTORY  Patient Active Problem List    Diagnosis Date Noted    Vaginal irritation 06/30/2023    Hypercholesterolemia 05/04/2023    Vitamin D deficiency 05/04/2023    Nasal trauma, sequela 01/27/2023    Overweight (BMI 25.0-29.9) 03/10/2022    Migraine 10/03/2018    Endometriosis determined by laparoscopy 02/14/2014       SURGICAL HISTORY   has a past surgical history that includes endometrial ablation; gyn surgery (2014); other (2013,2014); pelvic examination w anesth (N/A, 11/15/2022); and lap,fulgurate/excise lesions (11/15/2022).    ALLERGIES  No Known Allergies    CURRENT MEDICATIONS  Home Medications       Reviewed by Miracle Che P.A.-C. (Physician Assistant) on 08/10/23 at 0859  Med List Status: <None>     Medication Last Dose Status   acetaminophen (TYLENOL) 500 MG Tab Not Taking Active   ibuprofen (MOTRIN) 800 MG Tab Not Taking Active   Levonorgestrel  "(LILETTA, 52 MG, IU) Not Taking Active   Meth-Hyo-M Bl-Na Phos-Ph Sal (URIBEL) 118 MG Cap Not Taking Active   ondansetron (ZOFRAN) 4 MG Tab tablet Not Taking Active   rizatriptan (MAXALT) 10 MG tablet Not Taking Active                    SOCIAL HISTORY  Social History     Tobacco Use    Smoking status: Never    Smokeless tobacco: Never   Vaping Use    Vaping Use: Never used   Substance and Sexual Activity    Alcohol use: Not Currently     Alcohol/week: 1.2 oz     Types: 2 Standard drinks or equivalent per week     Comment: 2-3 a week     Drug use: Never    Sexual activity: Yes     Partners: Male     Birth control/protection: I.U.D.       FAMILY HISTORY  Family History   Problem Relation Age of Onset    Hypertension Father     Drug abuse Father     Heart Disease Maternal Grandmother     Diabetes Paternal Grandmother     Hypertension Paternal Grandmother     Cancer Paternal Grandfather         liver, skin          Objective:     VITAL SIGNS: /70 (BP Location: Right arm, Patient Position: Sitting, BP Cuff Size: Adult long)   Pulse 76   Temp 36.6 °C (97.8 °F) (Temporal)   Resp 15   Ht 1.753 m (5' 9\")   Wt 83.5 kg (184 lb)   SpO2 98%   BMI 27.17 kg/m²     PHYSICAL EXAM  Physical Exam  Vitals reviewed.   Constitutional:       General: She is not in acute distress.     Appearance: Normal appearance. She is not ill-appearing or toxic-appearing.   HENT:      Head: Normocephalic and atraumatic.      Nose: Nose normal.      Mouth/Throat:      Mouth: Mucous membranes are moist.   Eyes:      Conjunctiva/sclera: Conjunctivae normal.   Pulmonary:      Effort: Pulmonary effort is normal.   Abdominal:      Tenderness: There is no right CVA tenderness or left CVA tenderness.   Genitourinary:     Comments: Patient brought in photograph taken this morning of fissure and vaginal region.  Fissure is linear and is in between the labia minora and majora.  There is no surrounding erythema, swelling, or exudate.  No signs of " vesicles.  Skin:     General: Skin is warm and dry.   Neurological:      General: No focal deficit present.      Mental Status: She is alert.   Psychiatric:         Mood and Affect: Mood normal.         Assessment/Plan:     1. Dysuria  - VAGINAL PATHOGENS DNA PANEL; Future  - POCT Urinalysis  - POCT Pregnancy    2. Acute cystitis with hematuria  - nitrofurantoin (MACROBID) 100 MG Cap; Take 1 Capsule by mouth every 12 hours for 5 days.  Dispense: 10 Capsule; Refill: 0  -Increase hydration  -Avoid excess caffeine and alcohol until symptoms resolve  -Return to clinic if symptoms worsen or fail to resolve    MDM/Comments:  Patient has stable vital signs and is non-toxic appearing. Discussed supportive care with hydration, decreased alcohol and caffeine intake, avoidance of intercourse until UTI symptoms resolve. Patient instructed to complete full course of antibiotic. Will return if body aches, chills, fever, back/flank pain occur.  Vaginal pathogen swab obtained in clinic with results pending.  Discussed signs of kidney infection. Patient demonstrated understanding of plan of care and will RTC if symptoms worsen or fail to resolve.     Differential diagnosis, natural history, supportive care, and indications for immediate follow-up discussed. All questions answered. Patient agrees with the plan of care.    Follow-up as needed if symptoms worsen or fail to improve to PCP, Urgent care or Emergency Room.    I have personally reviewed prior external notes and test results pertinent to today's visit.  I have independently reviewed and interpreted all diagnostics ordered during this urgent care acute visit.   Discussed management options (risks,benefits, and alternatives to treatment). Pt expresses understanding and the treatment plan was agreed upon. Questions were encouraged and answered to pt's satisfaction.    Please note that this dictation was created using voice recognition software. I have made a reasonable attempt  to correct obvious errors, but I expect that there are errors of grammar and possibly content that I did not discover before finalizing the note.

## 2023-08-17 ENCOUNTER — APPOINTMENT (OUTPATIENT)
Dept: NEUROLOGY | Facility: MEDICAL CENTER | Age: 23
End: 2023-08-17
Attending: PSYCHIATRY & NEUROLOGY
Payer: OTHER GOVERNMENT

## 2023-08-23 ENCOUNTER — OFFICE VISIT (OUTPATIENT)
Dept: NEUROLOGY | Facility: MEDICAL CENTER | Age: 23
End: 2023-08-23
Attending: PSYCHIATRY & NEUROLOGY
Payer: OTHER GOVERNMENT

## 2023-08-23 VITALS
OXYGEN SATURATION: 99 % | TEMPERATURE: 97.7 F | BODY MASS INDEX: 28.11 KG/M2 | HEIGHT: 69 IN | SYSTOLIC BLOOD PRESSURE: 118 MMHG | WEIGHT: 189.82 LBS | HEART RATE: 60 BPM | DIASTOLIC BLOOD PRESSURE: 76 MMHG

## 2023-08-23 DIAGNOSIS — G43.E09 CHRONIC MIGRAINE WITH AURA: Primary | ICD-10-CM

## 2023-08-23 PROCEDURE — 64615 CHEMODENERV MUSC MIGRAINE: CPT | Performed by: PSYCHIATRY & NEUROLOGY

## 2023-08-23 PROCEDURE — 700101 HCHG RX REV CODE 250: Performed by: PSYCHIATRY & NEUROLOGY

## 2023-08-23 PROCEDURE — 3074F SYST BP LT 130 MM HG: CPT | Performed by: PSYCHIATRY & NEUROLOGY

## 2023-08-23 PROCEDURE — 700111 HCHG RX REV CODE 636 W/ 250 OVERRIDE (IP): Performed by: PSYCHIATRY & NEUROLOGY

## 2023-08-23 PROCEDURE — 3078F DIAST BP <80 MM HG: CPT | Performed by: PSYCHIATRY & NEUROLOGY

## 2023-08-23 RX ADMIN — SODIUM CHLORIDE 155 UNITS: 9 INJECTION INTRAMUSCULAR; INTRAVENOUS; SUBCUTANEOUS at 16:53

## 2023-08-23 ASSESSMENT — FIBROSIS 4 INDEX: FIB4 SCORE: 0.36

## 2023-08-23 NOTE — PROGRESS NOTES
[facility]  BOTOX PROCEDURE NOTE    Chronic Migraine:  Botox therapy has reduced patient’s migraines by more than 7 days and/or 100 hours per month.     I treated Gretel Peñaloza in clinic today with BotoxA 155 units according to the dosing/injection paradigm currently mandated by the FDA for the management of chronic migraine.  Specifically, I injected:  - 5 units to the procerus,  - 5 units to the corrugators (bilaterally),  - a total of 20 units to the frontalis musculature,  - 20 units to the temporalis (bilaterally),  - 15 units to the occipitalis (bilaterally),  - 10 units to the cervical paraspinals (bilaterally), and  - 15 units to the trapezius musculature (bilaterally).    The remainder of the Botox was discarded as wastage per FDA guidelines  Consent on file.  Patient identify verified with 2 patient identifiers.     Frequency of headaches is >15 days monthly with at least 8 migraines monthly.    Migraines include at least two of the following: worsened with activity or avoidance of activity with migraines (ie they go lie down), moderate to severe pain intensity, pulsing headache, unilateral headache and has  have either nausea or vomiting OR sensitivity to light and sound.     Although Gretel Peñaloza is responding to botox s/he is NOT migraine free.  I recommend that Botox be continued at this time.    Gretel Peñaloza has chronic migraines, defined as having 15 or more headaches days per month, 8 of which are migraines, over a minimum of the last three months.  Episodes last more than 4 hours (untreated).  Pt has 2 or more of following (see initial note):  - headache worsened with activity  - pain is moderate to severe intensity  - pulsing in nature  - unilateral   and patient either has nausea/vomiting OR sensitivity to light and sound.    No adverse effect of Botox noted at conclusion of today's appointment.    Follow up in 12 weeks for Botox or sooner if needed.    Signed:  Jean Carlos Gloria M.D.

## 2023-08-30 ENCOUNTER — HOSPITAL ENCOUNTER (OUTPATIENT)
Dept: LAB | Facility: MEDICAL CENTER | Age: 23
End: 2023-08-30
Attending: FAMILY MEDICINE
Payer: OTHER GOVERNMENT

## 2023-08-30 DIAGNOSIS — S09.92XS NASAL TRAUMA, SEQUELA: ICD-10-CM

## 2023-08-30 DIAGNOSIS — R63.5 WEIGHT GAIN: ICD-10-CM

## 2023-08-30 DIAGNOSIS — E55.9 VITAMIN D DEFICIENCY: ICD-10-CM

## 2023-08-30 DIAGNOSIS — Z00.00 WELLNESS EXAMINATION: ICD-10-CM

## 2023-08-30 LAB
25(OH)D3 SERPL-MCNC: 25 NG/ML (ref 30–100)
ALBUMIN SERPL BCP-MCNC: 4.4 G/DL (ref 3.2–4.9)
ALBUMIN/GLOB SERPL: 1.8 G/DL
ALP SERPL-CCNC: 63 U/L (ref 30–99)
ALT SERPL-CCNC: 12 U/L (ref 2–50)
ANION GAP SERPL CALC-SCNC: 11 MMOL/L (ref 7–16)
AST SERPL-CCNC: 17 U/L (ref 12–45)
BASOPHILS # BLD AUTO: 0.7 % (ref 0–1.8)
BASOPHILS # BLD: 0.04 K/UL (ref 0–0.12)
BILIRUB SERPL-MCNC: 0.8 MG/DL (ref 0.1–1.5)
BUN SERPL-MCNC: 12 MG/DL (ref 8–22)
CALCIUM ALBUM COR SERPL-MCNC: 8.9 MG/DL (ref 8.5–10.5)
CALCIUM SERPL-MCNC: 9.2 MG/DL (ref 8.5–10.5)
CHLORIDE SERPL-SCNC: 106 MMOL/L (ref 96–112)
CHOLEST SERPL-MCNC: 174 MG/DL (ref 100–199)
CO2 SERPL-SCNC: 22 MMOL/L (ref 20–33)
CREAT SERPL-MCNC: 0.8 MG/DL (ref 0.5–1.4)
EOSINOPHIL # BLD AUTO: 0.08 K/UL (ref 0–0.51)
EOSINOPHIL NFR BLD: 1.3 % (ref 0–6.9)
ERYTHROCYTE [DISTWIDTH] IN BLOOD BY AUTOMATED COUNT: 45.6 FL (ref 35.9–50)
FASTING STATUS PATIENT QL REPORTED: NORMAL
GFR SERPLBLD CREATININE-BSD FMLA CKD-EPI: 105 ML/MIN/1.73 M 2
GLOBULIN SER CALC-MCNC: 2.4 G/DL (ref 1.9–3.5)
GLUCOSE SERPL-MCNC: 82 MG/DL (ref 65–99)
HCT VFR BLD AUTO: 48.7 % (ref 37–47)
HDLC SERPL-MCNC: 43 MG/DL
HGB BLD-MCNC: 16.4 G/DL (ref 12–16)
IMM GRANULOCYTES # BLD AUTO: 0.06 K/UL (ref 0–0.11)
IMM GRANULOCYTES NFR BLD AUTO: 1 % (ref 0–0.9)
LDLC SERPL CALC-MCNC: 111 MG/DL
LYMPHOCYTES # BLD AUTO: 2.35 K/UL (ref 1–4.8)
LYMPHOCYTES NFR BLD: 39.3 % (ref 22–41)
MCH RBC QN AUTO: 32 PG (ref 27–33)
MCHC RBC AUTO-ENTMCNC: 33.7 G/DL (ref 32.2–35.5)
MCV RBC AUTO: 94.9 FL (ref 81.4–97.8)
MONOCYTES # BLD AUTO: 0.41 K/UL (ref 0–0.85)
MONOCYTES NFR BLD AUTO: 6.9 % (ref 0–13.4)
NEUTROPHILS # BLD AUTO: 3.04 K/UL (ref 1.82–7.42)
NEUTROPHILS NFR BLD: 50.8 % (ref 44–72)
NRBC # BLD AUTO: 0 K/UL
NRBC BLD-RTO: 0 /100 WBC (ref 0–0.2)
PLATELET # BLD AUTO: 333 K/UL (ref 164–446)
PMV BLD AUTO: 9 FL (ref 9–12.9)
POTASSIUM SERPL-SCNC: 4.5 MMOL/L (ref 3.6–5.5)
PROT SERPL-MCNC: 6.8 G/DL (ref 6–8.2)
RBC # BLD AUTO: 5.13 M/UL (ref 4.2–5.4)
SODIUM SERPL-SCNC: 139 MMOL/L (ref 135–145)
T3 SERPL-MCNC: 128 NG/DL (ref 60–181)
TRIGL SERPL-MCNC: 98 MG/DL (ref 0–149)
TSH SERPL DL<=0.005 MIU/L-ACNC: 1.63 UIU/ML (ref 0.38–5.33)
WBC # BLD AUTO: 6 K/UL (ref 4.8–10.8)

## 2023-08-30 PROCEDURE — 85025 COMPLETE CBC W/AUTO DIFF WBC: CPT

## 2023-08-30 PROCEDURE — 80053 COMPREHEN METABOLIC PANEL: CPT

## 2023-08-30 PROCEDURE — 84443 ASSAY THYROID STIM HORMONE: CPT

## 2023-08-30 PROCEDURE — 36415 COLL VENOUS BLD VENIPUNCTURE: CPT

## 2023-08-30 PROCEDURE — 82306 VITAMIN D 25 HYDROXY: CPT

## 2023-08-30 PROCEDURE — 84480 ASSAY TRIIODOTHYRONINE (T3): CPT

## 2023-08-30 PROCEDURE — 80061 LIPID PANEL: CPT

## 2023-09-01 ENCOUNTER — OFFICE VISIT (OUTPATIENT)
Dept: MEDICAL GROUP | Facility: PHYSICIAN GROUP | Age: 23
End: 2023-09-01
Payer: OTHER GOVERNMENT

## 2023-09-01 VITALS
WEIGHT: 190 LBS | TEMPERATURE: 97.9 F | DIASTOLIC BLOOD PRESSURE: 76 MMHG | OXYGEN SATURATION: 97 % | SYSTOLIC BLOOD PRESSURE: 110 MMHG | HEART RATE: 87 BPM | HEIGHT: 69 IN | BODY MASS INDEX: 28.14 KG/M2

## 2023-09-01 DIAGNOSIS — E78.00 HYPERCHOLESTEROLEMIA: ICD-10-CM

## 2023-09-01 DIAGNOSIS — Z71.84 TRAVEL ADVICE ENCOUNTER: ICD-10-CM

## 2023-09-01 DIAGNOSIS — E55.9 VITAMIN D DEFICIENCY: ICD-10-CM

## 2023-09-01 PROCEDURE — 99214 OFFICE O/P EST MOD 30 MIN: CPT | Performed by: FAMILY MEDICINE

## 2023-09-01 PROCEDURE — 3078F DIAST BP <80 MM HG: CPT | Performed by: FAMILY MEDICINE

## 2023-09-01 PROCEDURE — 3074F SYST BP LT 130 MM HG: CPT | Performed by: FAMILY MEDICINE

## 2023-09-01 RX ORDER — RIZATRIPTAN BENZOATE 10 MG/1
TABLET ORAL
COMMUNITY
End: 2023-10-09 | Stop reason: SDUPTHER

## 2023-09-01 RX ORDER — METHENAMINE, SODIUM PHOSPHATE, MONOBASIC, MONOHYDRATE, PHENYL SALICYLATE, METHYLENE BLUE, AND HYOSCYAMINE SULFATE 118; 40.8; 36; 10; .12 MG/1; MG/1; MG/1; MG/1; MG/1
CAPSULE ORAL
COMMUNITY

## 2023-09-01 RX ORDER — ATOVAQUONE AND PROGUANIL HYDROCHLORIDE 250; 100 MG/1; MG/1
TABLET, FILM COATED ORAL
Qty: 24 TABLET | Refills: 0 | Status: SHIPPED | OUTPATIENT
Start: 2023-09-01 | End: 2023-11-15

## 2023-09-01 RX ORDER — ONDANSETRON 4 MG/1
TABLET, FILM COATED ORAL
COMMUNITY

## 2023-09-01 RX ORDER — AZITHROMYCIN 500 MG/1
TABLET, FILM COATED ORAL
Qty: 2 TABLET | Refills: 0 | Status: SHIPPED | OUTPATIENT
Start: 2023-09-01 | End: 2023-11-15

## 2023-09-01 ASSESSMENT — FIBROSIS 4 INDEX: FIB4 SCORE: 0.35

## 2023-09-01 NOTE — PROGRESS NOTES
Subjective:     CC:   Chief Complaint   Patient presents with    Lab Results    Medication Management     Travel medication for diarrhea     Medication Refill     rizatriptan (MAXALT) 10 MG tablet       HPI:   Gretel presents today for follow-up of her labs also she is going to [facility] on Monday.  I did tell patient we do have a travel clinic patient states that she was not able to get in she already looked at the [facility] website and said that she needed Malarone.  Patient has never had immunization for typhoid.  Patient states she is up-to-date on her hepatitis a and B.  Patient is also requesting something in case she gets traveler's diarrhea.  Patient states she is going to [facility] for 2 weeks.  I did ask her in the past she wanted me to fill out some paperwork due to fact that her  was going to go overseas but their plans have changed and they are going to be transferring to another  base here in [place] in January.    Past Medical History:   Diagnosis Date    Benign skin mole 06/07/2021    Diarrhea 06/07/2021    Endometriosis     Exercise-induced asthma 06/07/2021    Gynecological disorder 2014    Endometriosis    Migraine        Social History     Tobacco Use    Smoking status: Never    Smokeless tobacco: Never   Vaping Use    Vaping Use: Never used   Substance Use Topics    Alcohol use: Yes     Alcohol/week: 1.2 oz     Types: 2 Standard drinks or equivalent per week     Comment: 2-3 a week     Drug use: Never       Current Outpatient Medications Ordered in Epic   Medication Sig Dispense Refill    atovaquone-proguanil (MALARONE) 250-100 MG per tablet Take 1 tablet by mouth starting 2 days before travel and continue until 7 days after return 24 Tablet 0    typhoid (VIVOTIF) SR capsule Take 1 every other day 4 Capsule 0    azithromycin (ZITHROMAX) 500 MG tablet Take 2 tablets in 1 day if diarrhea occurs 2 Tablet 0    Meth-Hyo-M Bl-Na Phos-Ph Sal (URIBEL) 118 MG Cap       ondansetron (ZOFRAN)  "4 MG Tab tablet       rizatriptan (MAXALT) 10 MG tablet        No current Epic-ordered facility-administered medications on file.       Allergies:  Patient has no known allergies.    Health Maintenance: Completed    ROS:  Gen: no fevers/chills, no changes in weight  Eyes: no changes in vision  ENT: no sore throat, no hearing loss, no bloody nose  Pulm: no sob, no cough  CV: no chest pain, no palpitations  GI: no nausea/vomiting, no diarrhea  : no dysuria  Neuro: no headaches, no numbness/tingling  Heme/Lymph: no easy bruising    Objective:     Exam:  /76   Pulse 87   Temp 36.6 °C (97.9 °F) (Temporal)   Ht 1.753 m (5' 9\")   Wt 86.2 kg (190 lb)   SpO2 97%   BMI 28.06 kg/m²  Body mass index is 28.06 kg/m².    Gen: Alert and oriented, No apparent distress.  Skin: Warm and dry.  No obvious lesions.  Eyes: Sclera wnl Pupils normal in size  Lungs: Normal effort, CTA bilaterally, no wheezes, rhonchi, or rales  CV: Regular rate and rhythm. No murmurs, rubs, or gallops.  Musculoskeletal: Normal gait. No extremity cyanosis, clubbing, or edema.  Neuro: Oriented to person, place and time  Psych: Mood is wnl       Assessment & Plan:     23 y.o. female with the following -     1. Hypercholesterolemia  Patient's LDL is slightly elevated would recommend repeating this again in another year.    2. Vitamin D deficiency  Patient's vitamin D is low recommend 1000 to 2000 IUs of vitamin D3 per day probably need to recheck this in the next couple months  3. Travel advice encounter  Patient expressed understanding of how to take the malaria medication and she needs to continue taking it 7 days out of her leaving [place].  Also wrote a prescription for Zithromax just in case she gets traveler's diarrhea.  Also wrote a prescription for typhoid even though unfortunately she will not complete the course prior to getting into [facility].    - atovaquone-proguanil (MALARONE) 250-100 MG per tablet; Take 1 tablet by mouth starting 2 " days before travel and continue until 7 days after return  Dispense: 24 Tablet; Refill: 0  - typhoid (VIVOTIF) SR capsule; Take 1 every other day  Dispense: 4 Capsule; Refill: 0  - azithromycin (ZITHROMAX) 500 MG tablet; Take 2 tablets in 1 day if diarrhea occurs  Dispense: 2 Tablet; Refill: 0       Return in about 2 months (around 11/1/2023), or if symptoms worsen or fail to improve.    Please note that this dictation was created using voice recognition software. I have made every reasonable attempt to correct obvious errors, but I expect that there are errors of grammar and possibly content that I did not discover before finalizing the note.

## 2023-09-29 RX ORDER — RIZATRIPTAN BENZOATE 10 MG/1
TABLET ORAL
Qty: 10 TABLET | Status: CANCELLED | OUTPATIENT
Start: 2023-09-29

## 2023-10-09 DIAGNOSIS — G43.E09 CHRONIC MIGRAINE WITH AURA WITHOUT STATUS MIGRAINOSUS, NOT INTRACTABLE: ICD-10-CM

## 2023-10-09 RX ORDER — RIZATRIPTAN BENZOATE 10 MG/1
TABLET ORAL
Qty: 10 TABLET | Refills: 0 | Status: SHIPPED | OUTPATIENT
Start: 2023-10-09 | End: 2023-12-06 | Stop reason: SDUPTHER

## 2023-10-10 ENCOUNTER — TELEPHONE (OUTPATIENT)
Dept: NEUROLOGY | Facility: MEDICAL CENTER | Age: 23
End: 2023-10-10

## 2023-10-10 NOTE — TELEPHONE ENCOUNTER
Received Refill PA request via [facility]  for Rizatriptan 10mg tab. (Quantity:12 tab, Day Supply:30)     Insurance: [employer]  Member ID:  [id number]  BIN: [id number]  PCN: [id number]  Group: [id number]     Ran Test claim via [place] [facility] medication Pays for a $7.91 copay. Will outreach to patient to offer specialty pharmacy services and or release to preferred pharmacy

## 2023-11-15 ENCOUNTER — OFFICE VISIT (OUTPATIENT)
Dept: NEUROLOGY | Facility: MEDICAL CENTER | Age: 23
End: 2023-11-15
Attending: PSYCHIATRY & NEUROLOGY
Payer: OTHER GOVERNMENT

## 2023-11-15 VITALS
HEIGHT: 69 IN | WEIGHT: 190.92 LBS | RESPIRATION RATE: 16 BRPM | OXYGEN SATURATION: 98 % | SYSTOLIC BLOOD PRESSURE: 110 MMHG | DIASTOLIC BLOOD PRESSURE: 62 MMHG | TEMPERATURE: 97.5 F | HEART RATE: 71 BPM | BODY MASS INDEX: 28.28 KG/M2

## 2023-11-15 DIAGNOSIS — G43.E09 CHRONIC MIGRAINE WITH AURA WITHOUT STATUS MIGRAINOSUS, NOT INTRACTABLE: Primary | ICD-10-CM

## 2023-11-15 PROCEDURE — 64615 CHEMODENERV MUSC MIGRAINE: CPT | Performed by: PSYCHIATRY & NEUROLOGY

## 2023-11-15 PROCEDURE — 700101 HCHG RX REV CODE 250: Performed by: PSYCHIATRY & NEUROLOGY

## 2023-11-15 PROCEDURE — 700111 HCHG RX REV CODE 636 W/ 250 OVERRIDE (IP): Performed by: PSYCHIATRY & NEUROLOGY

## 2023-11-15 RX ADMIN — SODIUM CHLORIDE 155 UNITS: 9 INJECTION INTRAMUSCULAR; INTRAVENOUS; SUBCUTANEOUS at 08:55

## 2023-11-15 ASSESSMENT — FIBROSIS 4 INDEX: FIB4 SCORE: 0.35

## 2023-12-06 ENCOUNTER — OFFICE VISIT (OUTPATIENT)
Dept: URGENT CARE | Facility: PHYSICIAN GROUP | Age: 23
End: 2023-12-06
Payer: OTHER GOVERNMENT

## 2023-12-06 VITALS
HEART RATE: 62 BPM | TEMPERATURE: 97.8 F | WEIGHT: 193 LBS | HEIGHT: 69 IN | RESPIRATION RATE: 16 BRPM | OXYGEN SATURATION: 99 % | SYSTOLIC BLOOD PRESSURE: 120 MMHG | DIASTOLIC BLOOD PRESSURE: 78 MMHG | BODY MASS INDEX: 28.58 KG/M2

## 2023-12-06 DIAGNOSIS — G43.E09 CHRONIC MIGRAINE WITH AURA WITHOUT STATUS MIGRAINOSUS, NOT INTRACTABLE: ICD-10-CM

## 2023-12-06 DIAGNOSIS — G43.111 INTRACTABLE MIGRAINE WITH AURA WITH STATUS MIGRAINOSUS: ICD-10-CM

## 2023-12-06 DIAGNOSIS — R11.0 NAUSEA: ICD-10-CM

## 2023-12-06 LAB
POCT INT CON NEG: NEGATIVE
POCT INT CON POS: POSITIVE
POCT URINE PREGNANCY TEST: NEGATIVE

## 2023-12-06 PROCEDURE — 3078F DIAST BP <80 MM HG: CPT | Performed by: STUDENT IN AN ORGANIZED HEALTH CARE EDUCATION/TRAINING PROGRAM

## 2023-12-06 PROCEDURE — 3074F SYST BP LT 130 MM HG: CPT | Performed by: STUDENT IN AN ORGANIZED HEALTH CARE EDUCATION/TRAINING PROGRAM

## 2023-12-06 PROCEDURE — 99214 OFFICE O/P EST MOD 30 MIN: CPT | Mod: 25 | Performed by: STUDENT IN AN ORGANIZED HEALTH CARE EDUCATION/TRAINING PROGRAM

## 2023-12-06 PROCEDURE — 81025 URINE PREGNANCY TEST: CPT | Performed by: STUDENT IN AN ORGANIZED HEALTH CARE EDUCATION/TRAINING PROGRAM

## 2023-12-06 RX ORDER — PROCHLORPERAZINE MALEATE 5 MG/1
5 TABLET ORAL EVERY 6 HOURS PRN
Qty: 20 TABLET | Refills: 0 | Status: SHIPPED | OUTPATIENT
Start: 2023-12-06

## 2023-12-06 RX ORDER — KETOROLAC TROMETHAMINE 30 MG/ML
30 INJECTION, SOLUTION INTRAMUSCULAR; INTRAVENOUS ONCE
Status: COMPLETED | OUTPATIENT
Start: 2023-12-06 | End: 2023-12-06

## 2023-12-06 RX ORDER — HYDROXYZINE PAMOATE 100 MG
100 CAPSULE ORAL 3 TIMES DAILY PRN
COMMUNITY

## 2023-12-06 RX ORDER — IBUPROFEN 800 MG/1
TABLET ORAL
COMMUNITY

## 2023-12-06 RX ADMIN — KETOROLAC TROMETHAMINE 30 MG: 30 INJECTION, SOLUTION INTRAMUSCULAR; INTRAVENOUS at 11:21

## 2023-12-06 ASSESSMENT — FIBROSIS 4 INDEX: FIB4 SCORE: 0.35

## 2023-12-06 NOTE — PROGRESS NOTES
Subjective:   CHIEF COMPLAINT  Chief Complaint   Patient presents with    Migraine     Today With aura       HPI  Gretel Peñaloza is a 23 y.o. female who presents with a chief complaint of a migraine.  She has a past medical history of chronic migraines and said symptoms developed 3 days ago, slightly improved and then worsened this morning.  Says there has been some change in her symptoms at today she developed an aura described as blurry vision and black spots.  Says the discomfort is localized along the left side of her forehead which seems to be radiating to the right side of her forehead.  Says symptoms are aggravated with bright lights.  She tried taking Maxalt this morning which has not helped.  Additionally had Botox injection 2 weeks ago; follows with neurology.  She has not taken any NSAIDs this morning.  Positive ROS for nausea without vomiting.  Denies experiencing any fevers.  No thunderclap headache.    REVIEW OF SYSTEMS  General: no fever or chills  GI: no nausea or vomiting  See HPI for further details.    PAST MEDICAL HISTORY  Patient Active Problem List    Diagnosis Date Noted    Travel advice encounter 09/01/2023    Vaginal irritation 06/30/2023    Hypercholesterolemia 05/04/2023    Vitamin D deficiency 05/04/2023    Nasal trauma, sequela 01/27/2023    Overweight (BMI 25.0-29.9) 03/10/2022    Migraine 10/03/2018    Endometriosis determined by laparoscopy 02/14/2014       SURGICAL HISTORY   has a past surgical history that includes endometrial ablation; gyn surgery (2014); other (2013,2014); pelvic examination w anesth (N/A, 11/15/2022); and lap,fulgurate/excise lesions (11/15/2022).    ALLERGIES  No Known Allergies    CURRENT MEDICATIONS  Home Medications       Reviewed by Rommel Valentine D.O. (Physician) on 12/06/23 at 1101  Med List Status: <None>     Medication Last Dose Status   hydrOXYzine pamoate (VISTARIL) 100 MG Cap Taking Active   ibuprofen (MOTRIN) 800 MG Tab Taking Active  "  Meth-Hyo-M Bl-Na Phos-Ph Sal (URIBEL) 118 MG Cap Taking Active   ondansetron (ZOFRAN) 4 MG Tab tablet Taking Active   rizatriptan (MAXALT) 10 MG tablet Taking Active                    SOCIAL HISTORY  Social History     Tobacco Use    Smoking status: Never    Smokeless tobacco: Never   Vaping Use    Vaping Use: Never used   Substance and Sexual Activity    Alcohol use: Yes     Alcohol/week: 1.2 oz     Types: 2 Standard drinks or equivalent per week     Comment: 2-3 a week     Drug use: Never    Sexual activity: Yes     Partners: Male     Birth control/protection: I.U.D.       FAMILY HISTORY  Family History   Problem Relation Age of Onset    Hypertension Father     Drug abuse Father     Heart Disease Maternal Grandmother     Diabetes Paternal Grandmother     Hypertension Paternal Grandmother     Cancer Paternal Grandfather         liver, skin          Objective:   PHYSICAL EXAM  VITAL SIGNS: /78   Pulse 62   Temp 36.6 °C (97.8 °F) (Temporal)   Resp 16   Ht 1.753 m (5' 9\")   Wt 87.5 kg (193 lb)   SpO2 99%   BMI 28.50 kg/m²       Gen: no acute distress, normal voice  Skin: dry, intact, moist mucosal membranes  Eyes: No conjunctival injection bilaterally.  Neck: Normal range of motion. No meningeal signs.   ENT: No oropharyngeal erythema or exudates. Uvula midline. TMs clear and intact b/l w/o bulging, erythema or effusion. No lymphadenopathy.  Lungs: No increased work of breathing.  CTAB w/ symmetric expansion  CV: RRR w/o murmurs or clicks  Neuro: Speech: conversant, fluent, no aphasia  Mental status: AAOx3  Gait: normal   CN: 2-12 grossly intact  Sensory exam: globally intact  Motor exam: no global deficits   Gaze-evoked left beating nystagmus  Psych: normal affect, normal judgement, alert, awake    hCG: Negative    Assessment/Plan:     1. Intractable migraine with aura with status migrainosus  POCT PREGNANCY    ketorolac (Toradol) injection 30 mg    prochlorperazine (COMPAZINE) 5 MG Tab      2. Nausea "  prochlorperazine (COMPAZINE) 5 MG Tab      Chronic issue with acute exacerbation.  No thunderclap headache, evidence of CNS infection, papilledema, neurologic deficits or red flags.  -Toradol 30 mg IM x 1 in the clinic.  No additional NSAIDs for 8 hours  -Alternate ibuprofen 800 mg 3 times daily with acetaminophen 1000 mg 3 times daily as needed  -Ordered Compazine to help with nausea  -Follow-up with neurology for continued management  -Return to urgent care any new/worsening symptoms or further questions or concerns.  Patient understood everything discussed.  All questions were answered.      Differential diagnosis and supportive care discussed. Follow-up as needed if symptoms worsen or fail to improve to PCP, Urgent care or Emergency Room.    Please note that this dictation was created using voice recognition software. I have made a reasonable attempt to correct obvious errors, but I expect that there are errors of grammar and possibly content that I did not discover before finalizing the note.

## 2023-12-07 RX ORDER — RIZATRIPTAN BENZOATE 10 MG/1
TABLET ORAL
Qty: 10 TABLET | Refills: 11 | Status: SHIPPED | OUTPATIENT
Start: 2023-12-07 | End: 2024-01-07

## 2023-12-07 NOTE — TELEPHONE ENCOUNTER
Received request via: Patient    Was the patient seen in the last year in this department? Yes   Date of last office visit 8/23/23    Per last Neurology Office Visit, when was the date of next follow up visit set for?  3 mths                          Date of office visit follow up request 11/23/23     Does the patient have an upcoming appointment? Yes   If yes, when? 12/12/23    Does the patient have an active prescription (recently filled or refills available) for medication(s) requested? No    Does the patient have [facility] and need 100 day supply (blood pressure, diabetes and cholesterol meds only)? Medication is not for cholesterol, blood pressure or diabetes

## 2023-12-12 ENCOUNTER — OFFICE VISIT (OUTPATIENT)
Dept: NEUROLOGY | Facility: MEDICAL CENTER | Age: 23
End: 2023-12-12
Attending: PSYCHIATRY & NEUROLOGY
Payer: OTHER GOVERNMENT

## 2023-12-12 ENCOUNTER — PATIENT MESSAGE (OUTPATIENT)
Dept: NEUROLOGY | Facility: MEDICAL CENTER | Age: 23
End: 2023-12-12

## 2023-12-12 ENCOUNTER — PHARMACY VISIT (OUTPATIENT)
Dept: PHARMACY | Facility: MEDICAL CENTER | Age: 23
End: 2023-12-12
Payer: COMMERCIAL

## 2023-12-12 VITALS
HEART RATE: 61 BPM | WEIGHT: 184.97 LBS | DIASTOLIC BLOOD PRESSURE: 68 MMHG | SYSTOLIC BLOOD PRESSURE: 104 MMHG | HEIGHT: 69 IN | RESPIRATION RATE: 16 BRPM | BODY MASS INDEX: 27.4 KG/M2 | TEMPERATURE: 97.3 F | OXYGEN SATURATION: 97 %

## 2023-12-12 DIAGNOSIS — G43.E09 CHRONIC MIGRAINE WITH AURA WITHOUT STATUS MIGRAINOSUS, NOT INTRACTABLE: Primary | ICD-10-CM

## 2023-12-12 PROCEDURE — 99213 OFFICE O/P EST LOW 20 MIN: CPT | Performed by: PSYCHIATRY & NEUROLOGY

## 2023-12-12 PROCEDURE — RXMED WILLOW AMBULATORY MEDICATION CHARGE: Performed by: PSYCHIATRY & NEUROLOGY

## 2023-12-12 PROCEDURE — 3078F DIAST BP <80 MM HG: CPT | Performed by: PSYCHIATRY & NEUROLOGY

## 2023-12-12 PROCEDURE — 3074F SYST BP LT 130 MM HG: CPT | Performed by: PSYCHIATRY & NEUROLOGY

## 2023-12-12 PROCEDURE — 99211 OFF/OP EST MAY X REQ PHY/QHP: CPT | Performed by: PSYCHIATRY & NEUROLOGY

## 2023-12-12 RX ORDER — ERENUMAB-AOOE 140 MG/ML
140 INJECTION, SOLUTION SUBCUTANEOUS
Qty: 1.12 ML | Refills: 0 | Status: SHIPPED | OUTPATIENT
Start: 2023-12-12 | End: 2024-01-14

## 2023-12-12 RX ORDER — ERENUMAB-AOOE 140 MG/ML
140 INJECTION, SOLUTION SUBCUTANEOUS
Qty: 2 ML | Refills: 0 | Status: SHIPPED | OUTPATIENT
Start: 2023-12-12 | End: 2024-01-12

## 2023-12-12 ASSESSMENT — FIBROSIS 4 INDEX: FIB4 SCORE: 0.35

## 2023-12-12 NOTE — PROGRESS NOTES
"[facility] NEUROLOGY  GENERAL NEUROLOGY  FOLLOW-UP VISIT    CC: chronic migraine w/ aura    INTERVAL HISTORY:  Gretel Peñaloza is a 23 y.o. woman with chronic migraine w/ aura and HLD.  I last saw her in the clinic on 11/15/2023.  At that time I recommended she start Botox.  Today, she was unaccompanied, and she provided the following interval history:    The following is a summary of headache symptoms, presented in my standard format:     Family History: father  Age at onset (years): 15  Location: left supra-orbital  Radiation: rarely posterior cervical  Frequency: baseline: 5-6 times/week  Duration: baseline: 2-3 hours, with treatment: 30-60 minutes  Headache Days/Month: 28-29/30  Quality: \"pounding, throbbing, pressure\"  Intensity: 8-9/10  Aura: visual  Photophobia/Phonophobia/Nausea/Vomiting: yes/no/yes/rarely  Provoked by Physical Activity?: none  Triggers: stress, often no obvious triggers  Associated Symptoms: none  Autonomic Signs (such as ptosis, miosis, conjunctival injection, rhinorrhea, increased lacrimation): some scleral redness  Head Trauma: broken nose x3, surgery x2  Association with Menses: no cycles  ED Visits: yes, most recently in high school  Hospitalizations: no  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, 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  Sleep (hours/night): 6-7 hours/night  Caffeine Intake: <12 oz coffee/some days  Hydration: 80 oz/day  Nutrition: eats regularly (yogurt for breakfast)  Exercise: 3x/week  Analgesic Overuse:      Current Medication Regimen:  - Botox: helpful only for ~2 weeks  - ibuprofen: sometimes daily  - acetaminophen:   - rizatriptan: helpful, uses this often    Medications Tried: Response  Preventive:  - nortriptyline: 50 mg was ineffective  - topiramate: ineffective  - Botox: only effective for ~2 weeks     Rescue:  -      Medications Not Tried:  -     MEDICATIONS:  Current Outpatient Medications   Medication Sig    rizatriptan (MAXALT) 10 MG tablet Take 10 mg at the onset of " aura/HA; may re-dose x1 after 2 hrs if HA persists; MDD: 20 mg; do not use >2 days/week.    ibuprofen (MOTRIN) 800 MG Tab     hydrOXYzine pamoate (VISTARIL) 100 MG Cap Take 100 mg by mouth 3 times a day as needed for Itching.    prochlorperazine (COMPAZINE) 5 MG Tab Take 1 Tablet by mouth every 6 hours as needed for Nausea/Vomiting.    Meth-Hyo-M Bl-Na Phos-Ph Sal (URIBEL) 118 MG Cap     ondansetron (ZOFRAN) 4 MG Tab tablet      MEDICAL, SOCIAL, AND FAMILY HISTORY:  There is no change in the patient's ROS or medical, social, or family histories since the previous visit on 11/15/2023.    REVIEW OF SYSTEMS:  A ROS was completed.  Pertinent positives and negatives were included in the HPI, above.  All other systems were reviewed and are negative.    PHYSICAL EXAM:  General/Medical:  - NAD    Neuro:  MENTAL STATUS: awake and alert; no deficits of speech or language; oriented to conversation; affect was appropriate to situation; pleasant, cooperative    CRANIAL NERVES:    II: acuity: NT, fields: NT, pupils: NT, discs: NT    III/IV/VI: versions: grossly intact    V: facial sensation: NT    VII: facial expression: symmetric    VIII: hearing: intact to voice    IX/X: palate: NT    XI: shoulder shrug: NT    XII: tongue: NT    MOTOR:  - bulk: NT  - tone: NT  Upper Extremity Strength (R/L)    NT   Elbow flexion NT   Elbow extension NT   Shoulder abduction NT     Lower Extremity Strength  (R/L)   Hip flexion NT   Knee extension NT   Knee flexion NT   Ankle dorsiflexion NT   Ankle plantarflexion NT     - pronator drift: NT  - abnormal movements: none    SENSATION:  - light touch: NT  - vibration (R/L, seconds): NT at the great toes  - pinprick: NT  - proprioception: NT  - Romberg: NT    COORDINATION:  - finger to nose: NT  - finger tapping: NT    REFLEXES:  Reflex Right Left   BR NT NT   Biceps NT NT   Triceps NT NT   Patellae NT NT   Achilles NT NT   Toes NT NT     GAIT:  - NT    REVIEW OF IMAGING STUDIES:  No additional  data since the last visit.    REVIEW OF LABORATORY STUDIES:  No recent data available.    ASSESSMENT:  Gretel Peñaloza is a 23 y.o. woman with chronic migraine w/ aura as well as HLD.  Plans/recommendations as follows:    PLAN:  Chronic Migraine w/ Aura:  Prevention:  - stop Botox  - trial of Aimovig 140 mg/month (samples administered today)  - get 7-9 hours of sleep per night; can try supplementing melatonin 2-10 mg, 2-3 hours before bedtime  - drink plenty of fluids (urine should be nearly clear)  - avoid excessive caffeine intake (no more than 2 servings per day and nothing in the afternoon)  - eat regular meals (don't skip meals)  - get moderate exercise (even just a 20 minute walk daily)    Rescue:  - continue rizatriptan 10 mg: take this at the onset of aura or headache pain; may re-dose x1 after 2 hours if headache persists; do not use more than 2 days/week  - do not use analgesics (e.g., ibuprofen, acetaminophen) more than 2 days per week in order to avoid analgesic rebound headaches    - keep a headache log    Follow-Up:  - Return if symptoms worsen or fail to improve.    Signed: Jean Carlos Gloria M.D.

## 2023-12-13 PROCEDURE — RXMED WILLOW AMBULATORY MEDICATION CHARGE: Performed by: PSYCHIATRY & NEUROLOGY

## 2023-12-15 ENCOUNTER — PHARMACY VISIT (OUTPATIENT)
Dept: PHARMACY | Facility: MEDICAL CENTER | Age: 23
End: 2023-12-15
Payer: COMMERCIAL

## 2024-02-07 ENCOUNTER — APPOINTMENT (OUTPATIENT)
Dept: NEUROLOGY | Facility: MEDICAL CENTER | Age: 24
End: 2024-02-07
Attending: PSYCHIATRY & NEUROLOGY
Payer: OTHER GOVERNMENT

## (undated) DEVICE — ANTI-FOG SOLUTION - 60BTL/CA

## (undated) DEVICE — DRAPESURG STERI-DRAPE LONG - (10/BX 4BX/CA)

## (undated) DEVICE — GLOVE BIOGEL PI INDICATOR SZ 6.5 SURGICAL PF LF - (50/BX 4BX/CA)

## (undated) DEVICE — SEALER LAPROSCOPIC L HOOK LIGASURE 44MM (6EA/CA)

## (undated) DEVICE — SYSTEM CLEARIFY VISUALIZATION (10EA/PK)

## (undated) DEVICE — SPONGE GAUZESTER. 2X2 4-PL - (2/PK 50PK/BX 30BX/CS)

## (undated) DEVICE — TUBING SETDISPOS HIGH FLOW II - (10/BX)

## (undated) DEVICE — KIT  I.V. START (100EA/CA)

## (undated) DEVICE — TUBING CLEARLINK DUO-VENT - C-FLO (48EA/CA)

## (undated) DEVICE — Device

## (undated) DEVICE — SENSOR OXIMETER ADULT SPO2 RD SET (20EA/BX)

## (undated) DEVICE — SET LEADWIRE 5 LEAD BEDSIDE DISPOSABLE ECG (1SET OF 5/EA)

## (undated) DEVICE — JELLY SURGILUBE STERILE TUBE 4.25 OZ (1/EA)

## (undated) DEVICE — TROCAR LAPSCP 100MM 12MM NTHRD - (6/BX)

## (undated) DEVICE — WATER IRRIGATION STERILE 1000ML (12EA/CA)

## (undated) DEVICE — LACTATED RINGERS INJ 1000 ML - (14EA/CA 60CA/PF)

## (undated) DEVICE — STERI STRIP COMPOUND BENZOIN - TINCTURE 0.6ML WITH APPLICATOR (40EA/BX)

## (undated) DEVICE — CANNULA W/ SUPPLY TUBING O2 - (50/CA)

## (undated) DEVICE — SLEEVE VASO CALF MED - (10PR/CA)

## (undated) DEVICE — DRESSING TRANSPARENT FILM TEGADERM 2.375 X 2.75"  (100EA/BX)"

## (undated) DEVICE — CANISTER SUCTION RIGID RED 1500CC (40EA/CA)

## (undated) DEVICE — SET IRRIGATION CYSTOSCOPY Y-TYPE L81 IN (20EA/CA)

## (undated) DEVICE — SUCTION INSTRUMENT YANKAUER BULBOUS TIP W/O VENT (50EA/CA)

## (undated) DEVICE — PAD SANITARY 11IN MAXI IND WRAPPED  (12EA/PK 24PK/CA)

## (undated) DEVICE — DRAPE LARGE 3 QUARTER - (20/CA)

## (undated) DEVICE — CLOSURE SKIN STRIP 1/2 X 4 IN - (STERI STRIP) (50/BX 4BX/CA)

## (undated) DEVICE — DRAPE MAYO STAND - (30/CA)

## (undated) DEVICE — SUTURE 0 VICRYL PLUS CT-2 - 27 INCH (36/BX)

## (undated) DEVICE — GOWN WARMING STANDARD FLEX - (30/CA)

## (undated) DEVICE — SUTURE 4-0 MONOCRYL PLUS PS-2 - 27 INCH (36/BX)

## (undated) DEVICE — TROCAR 5X100 BLADED ADVANCE - FIXATION (6/BX)

## (undated) DEVICE — CANISTER SUCTION 3000ML MECHANICAL FILTER AUTO SHUTOFF MEDI-VAC NONSTERILE LF DISP  (40EA/CA)

## (undated) DEVICE — SET IRRIGATION CYSTOSCOPY TUBE L80 IN (20EA/CA)

## (undated) DEVICE — GLOVE BIOGEL SZ 6.5 SURGICAL PF LTX (50PR/BX 4BX/CA)

## (undated) DEVICE — TUBE CONNECTING SUCTION - CLEAR PLASTIC STERILE 72 IN (50EA/CA)

## (undated) DEVICE — SODIUM CHL IRRIGATION 0.9% 1000ML (12EA/CA)

## (undated) DEVICE — MASK OXYGEN VNYL ADLT MED CONC WITH 7 FOOT TUBING  - (50EA/CA)

## (undated) DEVICE — TOWEL STOP TIMEOUT SAFETY FLAG (40EA/CA)

## (undated) DEVICE — UTERINE MANIP RUMI 6.7X6 - (5/BX)